# Patient Record
Sex: FEMALE | Race: WHITE | URBAN - METROPOLITAN AREA
[De-identification: names, ages, dates, MRNs, and addresses within clinical notes are randomized per-mention and may not be internally consistent; named-entity substitution may affect disease eponyms.]

---

## 2017-01-05 ENCOUNTER — OFFICE VISIT (OUTPATIENT)
Dept: FAMILY MEDICINE CLINIC | Age: 37
End: 2017-01-05

## 2017-01-05 VITALS
OXYGEN SATURATION: 100 % | BODY MASS INDEX: 42.15 KG/M2 | RESPIRATION RATE: 18 BRPM | SYSTOLIC BLOOD PRESSURE: 137 MMHG | HEART RATE: 78 BPM | TEMPERATURE: 98.1 F | DIASTOLIC BLOOD PRESSURE: 86 MMHG | WEIGHT: 253 LBS | HEIGHT: 65 IN

## 2017-01-05 DIAGNOSIS — M79.644 PAIN OF FINGER OF RIGHT HAND: Primary | ICD-10-CM

## 2017-01-05 NOTE — PROGRESS NOTES
Yary Morris is a 39 y.o. female presents to office for finger pain. 1. Have you been to the ER, urgent care clinic or hospitalized since your last visit? no  2. Have you seen any other providers outside of [facility] since your last visit? no  3. Have you had a Flu shot this year?  Pt declines      Health Maintenance items with a due date reviewed with patient:  Health Maintenance Due   Topic Date Due    DTaP/Tdap/Td series (1 - Tdap) 06/19/2001    PAP AKA CERVICAL CYTOLOGY  06/19/2001    INFLUENZA AGE 9 TO ADULT  08/01/2016

## 2017-01-05 NOTE — PATIENT INSTRUCTIONS
Hand Pain: Care Instructions  Your Care Instructions  Common causes of hand pain are overuse and injuries, such as might happen during sports or home repair projects. Everyday wear and tear, especially as you get older, also can cause hand pain. Most minor hand injuries will heal on their own, and home treatment is usually all you need to do. If you have sudden and severe pain, you may need tests and treatment. Follow-up care is a key part of your treatment and safety. Be sure to make and go to all appointments, and call your doctor if you are having problems. Its also a good idea to know your test results and keep a list of the medicines you take. How can you care for yourself at home? · Take pain medicines exactly as directed. ¨ If the doctor gave you a prescription medicine for pain, take it as prescribed. ¨ If you are not taking a prescription pain medicine, ask your doctor if you can take an over-the-counter medicine. · Rest and protect your hand. Take a break from any activity that may cause pain. · Put ice or a cold pack on your hand for 10 to 20 minutes at a time. Put a thin cloth between the ice and your skin. · Prop up the sore hand on a pillow when you ice it or anytime you sit or lie down during the next 3 days. Try to keep it above the level of your heart. This will help reduce swelling. · If your doctor recommends a sling, splint, or elastic bandage to support your hand, wear it as directed. When should you call for help? Call 911 anytime you think you may need emergency care. For example, call if:  · Your hand turns cool or pale or changes color. Call your doctor now or seek immediate medical care if:  · You cannot move your hand. · Your hand pops, moves out of its normal position, and then returns to its normal position. · You have signs of infection, such as:  ¨ Increased pain, swelling, warmth, or redness. ¨ Red streaks leading from the sore area.   ¨ Pus draining from a place on your hand. ¨ A fever. · Your hand feels numb or tingly. Watch closely for changes in your health, and be sure to contact your doctor if:  · Your hand feels unstable when you try to use it. · You do not get better as expected. · You have any new symptoms, such as swelling. · Bruises from an injury to your hand last longer than 2 weeks. Where can you learn more? Go to http://mario-felipe.info/. Enter R273 in the search box to learn more about \"Hand Pain: Care Instructions. \"  Current as of: May 27, 2016  Content Version: 11.1  © 5917-9585 [employer]. Care instructions adapted under license by [employer] (which disclaims liability or warranty for this information). If you have questions about a medical condition or this instruction, always ask your healthcare professional. [address] any warranty or liability for your use of this information.

## 2017-01-05 NOTE — PROGRESS NOTES
Abhi Oleary is a 39 y.o.  female and presents with persistent pain on the right middle finger. XR a few months ago normal.  Patient has mild to moderate pain and mild deformity still but edema has resolved. No neuropathy or radiculopathy. Chief Complaint   Patient presents with    Finger Pain     Subjective: Additional Concerns: none    Patient Active Problem List    Diagnosis Date Noted    Pain in finger of right hand 10/31/2016    Acute bilateral low back pain without sciatica 06/24/2016    Urinary frequency 06/24/2016    Vitamin D deficiency 02/04/2016    BMI 39.0-39.9,adult 02/04/2016    Restless sleeper 02/04/2016    Influenza vaccination declined 02/04/2016     Current Outpatient Prescriptions   Medication Sig Dispense Refill    ibuprofen (MOTRIN) 600 mg tablet Take 1 Tab by mouth every eight (8) hours as needed for Pain. Indications: PAIN 60 Tab 2    fluticasone (FLONASE) 50 mcg/actuation nasal spray 2 Sprays by Both Nostrils route daily. 1 Bottle 0    ergocalciferol (ERGOCALCIFEROL) 50,000 unit capsule Take 1 Cap by mouth every seven (7) days. 12 Cap 1    GUAIFENESIN/PSEUDOEPHEDRNE HCL (MUCINEX D PO) Take  by mouth.  metFORMIN (GLUCOPHAGE) 500 mg tablet Take 1 Tab by mouth two (2) times daily (with meals). 180 Tab 1    topiramate (TOPAMAX) 25 mg tablet Take 1 Tab by mouth two (2) times a day. 180 Tab 0    omega-3 fatty acids-vitamin e 1,000 mg cap Take 1 Cap by mouth.  cyanocobalamin 1,000 mcg tablet Take 1,000 mcg by mouth daily. Allergies   Allergen Reactions    Codeine Other (comments)     Pass out, sweaty    Morphine Itching     Hives     Past Medical History   Diagnosis Date    BMI 39.0-39.9,adult 2/4/2016    Restless sleeper 2/4/2016    Vitamin D deficiency      Past Surgical History   Procedure Laterality Date    Hx cholecystectomy      Hx appendectomy      Hx ankle fracture tx       right     No family history on file.   Social History Substance Use Topics    Smoking status: Never Smoker    Smokeless tobacco: Never Used    Alcohol use Yes      Comment: Once a week     ROS     General: negative for - chills, fatigue, fever, weight change  Resp: negative for - cough, shortness of breath or wheezing  CV: negative for - chest pain, edema or palpitations  Neuro: negative for -  weakness    Objective:  Vitals:    01/05/17 1656   BP: 137/86   Pulse: 78   Resp: 18   Temp: 98.1 °F (36.7 °C)   TempSrc: Oral   SpO2: 100%   Weight: 253 lb (114.8 kg)   Height: 5' 5\" (1.651 m)   PainSc:   3   PainLoc: Finger   LMP: 12/22/2016     PE    Alert, well appearing, and in no distress, oriented to person, place, and time and overweight  Mental status - alert, oriented to person, place, and time, normal mood, behavior, speech, dress, motor activity, and thought processes  Extremities - peripheral pulses normal, no pedal edema, no clubbing or cyanosis, mild pain on palpation for right middle finger with mild deformity. Flexion and extension function is preserved. LABS   No visits with results within 6 Month(s) from this visit.   Latest known visit with results is:    Office Visit on 06/24/2016   Component Date Value Ref Range Status    Color (UA POC) 06/24/2016 Yellow   Final    Clarity (UA POC) 06/24/2016 Cloudy   Final    Glucose (UA POC) 06/24/2016 Negative  Negative Final    Bilirubin (UA POC) 06/24/2016 Negative  Negative Final    Ketones (UA POC) 06/24/2016 Negative  Negative Final    Specific gravity (UA POC) 06/24/2016 1.025  1.001 - 1.035 Final    Blood (UA POC) 06/24/2016 3+  Negative Final    pH (UA POC) 06/24/2016 6  4.6 - 8.0 Final    Protein (UA POC) 06/24/2016 2+  Negative mg/dL Final    Urobilinogen (UA POC) 06/24/2016 0.2 mg/dL  0.2 - 1 Final    Nitrites (UA POC) 06/24/2016 Negative  Negative Final    Leukocyte esterase (UA POC) 06/24/2016 Trace  Negative Final       TESTS  Results for orders placed or performed in visit on 06/24/16 AMB POC URINALYSIS DIP STICK AUTO W/ MICRO   Result Value Ref Range    Color (UA POC) Yellow     Clarity (UA POC) Cloudy     Glucose (UA POC) Negative Negative    Bilirubin (UA POC) Negative Negative    Ketones (UA POC) Negative Negative    Specific gravity (UA POC) 1.025 1.001 - 1.035    Blood (UA POC) 3+ Negative    pH (UA POC) 6 4.6 - 8.0    Protein (UA POC) 2+ Negative mg/dL    Urobilinogen (UA POC) 0.2 mg/dL 0.2 - 1    Nitrites (UA POC) Negative Negative    Leukocyte esterase (UA POC) Trace Negative     Assessment/Plan:      Chronic mild right middle finger pain and deformity - Re XR will be done tomorrow. We will call patient for results and possible PT for this problem if XR os OK still. OTC meds enough. Lab review: no lab studies available for review at time of visit. I have discussed the diagnosis with the patient and the intended plan as seen in the above orders. The patient has received an after-visit summary and questions were answered concerning future plans. I have discussed medication side effects and warnings with the patient as well. I have reviewed the plan of care with the patient, accepted their input and they are in agreement with the treatment goals. Follow-up Disposition:  Return if symptoms worsen or fail to improve.     Jyoti Purcell MD

## 2017-01-17 ENCOUNTER — OFFICE VISIT (OUTPATIENT)
Dept: FAMILY MEDICINE CLINIC | Age: 37
End: 2017-01-17

## 2017-01-17 VITALS
OXYGEN SATURATION: 97 % | RESPIRATION RATE: 16 BRPM | TEMPERATURE: 98.2 F | SYSTOLIC BLOOD PRESSURE: 120 MMHG | DIASTOLIC BLOOD PRESSURE: 90 MMHG | WEIGHT: 254 LBS | BODY MASS INDEX: 42.32 KG/M2 | HEART RATE: 88 BPM | HEIGHT: 65 IN

## 2017-01-17 DIAGNOSIS — M25.532 LEFT WRIST PAIN: ICD-10-CM

## 2017-01-17 DIAGNOSIS — M79.644 PAIN IN FINGER OF RIGHT HAND: Primary | ICD-10-CM

## 2017-01-17 DIAGNOSIS — L30.9 DERMATITIS: ICD-10-CM

## 2017-01-17 DIAGNOSIS — M25.649 DECREASED RANGE OF MOTION OF FINGER: ICD-10-CM

## 2017-01-17 RX ORDER — BISMUTH SUBSALICYLATE 262 MG
1 TABLET,CHEWABLE ORAL DAILY
COMMUNITY
End: 2018-08-29

## 2017-01-17 RX ORDER — KETOCONAZOLE 200 MG/1
200 TABLET ORAL DAILY
Qty: 2 TAB | Refills: 0 | Status: SHIPPED | OUTPATIENT
Start: 2017-01-17 | End: 2018-08-29

## 2017-01-17 RX ORDER — KETOCONAZOLE 20 MG/G
CREAM TOPICAL 2 TIMES DAILY
Qty: 30 G | Refills: 1 | Status: SHIPPED | OUTPATIENT
Start: 2017-01-17 | End: 2018-04-10 | Stop reason: SDUPTHER

## 2017-01-17 NOTE — PATIENT INSTRUCTIONS
For your rash:  -- Single dose treatment:  Take 2 capsules of ketoconazole by mouth with a full glass of water. This treatment is much more effective if you exercise 4-6 hours after taking the dose to induce sweating. Allow sweat to dry on your skin and delay showering for 24 hours. This allows a film of medication to remain on your skin for a day and generally resolves the infection. Stay well hydrated. STAY UP TO DATE WITH YOUR PREVENTIVE HEALTH:     Please review the following recommendations and if you are not sure that your healthcare is up to date, ask your provider at your next scheduled office visit. -- Yearly preventive visits (sometimes called \"physicals\") are recommended for all adults. Medicare and Medicaid do not pay for physicals. Medicare covers a yearly wellness visit that differs in many ways from a traditional physical, but is an opportunity to make sure you are maximizing the preventive services that will be covered by Medicare. Each insurance carrier will have different regulations about what services may be covered, so be sure to talk with your insurance provider before scheduling a visit. -- Colon cancer screening is recommended for all patients 48 and older with either colonoscopy (interval will vary depending on results) or yearly stool testing.      -- Mammogram is recommended every 2 years for women ages 36 to 76. -- Pap smear is recommended every 3-5 years for women aged 24 to 72, unless your cervix has been surgically removed for benign reasons. -- Flu shot is recommended every fall for adults of all ages. -- Prevnar 15 is recommended at the age of 72 for all adults. -- Pneumovax is recommended one year after Prevnar 13 for all adults, but earlier if you have a history of chronic health problems (including diabetes and asthma) or smoking. -- Tetanus boosters are recommended every 10 years for adults of all ages.   Medicare and Medicaid do not cover tetanus as a preventive booster, but will pay for patients to receive a booster in the event of certain injuries. INSTRUCTIONS AFTER YOUR VISIT ON 1/17/2017     FASTING LABS  (fasting means nothing by mouth except medications with water or black coffee for at least 10 hours) were ordered to be completed    -- Our lab does not require a scheduled appointment. You can return to the office during lab hours within the next 1-2 weeks to complete your fasting labs. -- Lab hours at [facility] are 7a-3:00pm Monday-Friday. Please check the holiday closures below to make sure the office is open on the day you plan to return. LAB RESULTS can be obtained by logging into your [employer] account. If you need assistance with accessing your account, you can call the [address] [place] [address] at #[phone]. REFERRALS - If you were referred to a specialist or consultant today, this often needs to be authorized by your insurance company before it can be scheduled. You should be contacted within 2 weeks by the consultant's office to schedule the visit. If you do not hear from the specialist's office in that time frame, please call my office and the staff here can check on the status of your referral.      TESTING RESULTS   -- Lab results may become available for your review on [employer] before your provider has an opportunity to write you a note about results. Review of routine lab results will generally be done in 10-14 days. Please save your inquiries about results until your provider has had time to review them. -- If you have testing done outside of the office, please call to let us know the date and location that your testing was completed. Results can often be obtained faster if an inquiry is run.       MEDICATION REFILLS      -- Controlled substance refills (medications that require printed prescriptions for monitoring of use per [facility] guidelines) must be picked up in the office and per medical group policy will require a scheduled office visit with the provider. Calling the after hours answering service to request a controlled substance represents a violation of [facility] controlled substance policy. -- All other medication refills are to be requested by calling your pharmacy during regular office hours. The after hours physician on call is not required to respond to calls about medication refills. It is your responsibility to keep track of when you may be running out of medication and to place refill requests at least 3 business days prior. [facility]      Scheduling appointments can be done at the  or by calling [phone] and selecting option #1. HOLIDAY CLOSURES:     The office is closed on six major holidays each year:  New Year's Day, July 4th, Memorial Day, Labor Day, Thanksgiving, and Heladio Day. Lab and X-ray services are not available on those days.

## 2017-01-17 NOTE — MR AVS SNAPSHOT
Visit Information Date & Time Provider Department Dept. Phone Encounter #  
 1/17/2017  2:30 PM Aletha Church, 3 [facility] [phone] [id number] Upcoming Health Maintenance Date Due DTaP/Tdap/Td series (1 - Tdap) 6/19/2001 PAP AKA CERVICAL CYTOLOGY 6/19/2001 Allergies as of 1/17/2017  Review Complete On: 1/17/2017 By: Joel Andre LPN Severity Noted Reaction Type Reactions Codeine  02/04/2016    Other (comments) Pass out, sweaty Morphine  02/04/2016    Itching Hives Current Immunizations  Reviewed on 1/17/2017 No immunizations on file. Reviewed by Aletha Church MD on 1/17/2017 at  3:00 PM  
You Were Diagnosed With   
  
 Codes Comments Pain in finger of right hand    -  Primary ICD-10-CM: M64.238 ICD-9-CM: 729.5 Dermatitis     ICD-10-CM: L30.9 ICD-9-CM: 692.9 Vitals BP Pulse Temp Resp Height(growth percentile) Weight(growth percentile) 120/90 88 98.2 °F (36.8 °C) (Oral) 16 5' 5\" (1.651 m) 254 lb (115.2 kg) LMP SpO2 BMI OB Status Smoking Status 12/22/2016 (Exact Date) 97% 42.27 kg/m2 Having regular periods Never Smoker Vitals History BMI and BSA Data Body Mass Index Body Surface Area  
 42.27 kg/m 2 2.3 m 2 Preferred Pharmacy Pharmacy Name Phone [address] [place] [address] Drive [phone] Your Updated Medication List  
  
   
This list is accurate as of: 1/17/17  3:05 PM.  Always use your most recent med list.  
  
  
  
  
 cyanocobalamin 1,000 mcg tablet Take 1,000 mcg by mouth daily. ergocalciferol 50,000 unit capsule Commonly known as:  ERGOCALCIFEROL Take 1 Cap by mouth every seven (7) days. fluticasone 50 mcg/actuation nasal spray Commonly known as:  Nilda Manville 2 Sprays by Both Nostrils route daily. ibuprofen 600 mg tablet Commonly known as:  MOTRIN Take 1 Tab by mouth every eight (8) hours as needed for Pain. Indications: PAIN  
  
 * ketoconazole 200 mg tablet Commonly known as:  NIZORAL Take 1 Tab by mouth daily. * ketoconazole 2 % topical cream  
Commonly known as:  NIZORAL Apply  to affected area two (2) times a day. metFORMIN 500 mg tablet Commonly known as:  GLUCOPHAGE Take 1 Tab by mouth two (2) times daily (with meals). MUCINEX D PO Take  by mouth.  
  
 multivitamin tablet Commonly known as:  ONE A DAY Take 1 Tab by mouth daily. omega-3 fatty acids-vitamin e 1,000 mg Cap Take 1 Cap by mouth. PROBIOTIC 4X 10-15 mg Tbec Generic drug:  B.infantis-B.ani-B.long-B.bifi Take  by mouth. topiramate 25 mg tablet Commonly known as:  TOPAMAX Take 1 Tab by mouth two (2) times a day. * Notice: This list has 2 medication(s) that are the same as other medications prescribed for you. Read the directions carefully, and ask your doctor or other care provider to review them with you. Prescriptions Sent to Pharmacy Refills  
 ketoconazole (NIZORAL) 200 mg tablet 0 Sig: Take 1 Tab by mouth daily. Class: Normal  
 Pharmacy: [facility] [employer] [facility] [place] [address]. [facility] [address] [place] [address] #: [phone] Route: Oral  
 ketoconazole (NIZORAL) 2 % topical cream 1 Sig: Apply  to affected area two (2) times a day. Class: Normal  
 Pharmacy: [facility] [employer] [facility] [address], [facility] [address] [facility] Ph #: [phone] Route: Topical  
  
We Performed the Following REFERRAL TO HAND SURGERY [REF31 Custom] Comments:  
 Please evaluate patient for persistent right 3rd finger pain and limitation of flexion/extension since jamming finger in early October. No fracture on initial X-rays. Still painful with certain activities and movements. Referral Information Referral ID Referred By Referred To  
  
 [id number] Cesario Martel MD   
   [facility] [address]   
   [facility], [address] Phone: [phone] Fax: [phone] Visits Status Start Date End Date 1 Authorized 1/17/17 1/17/18 If your referral has a status of pending review or denied, additional information will be sent to support the outcome of this decision. Patient Instructions For your rash: 
-- Single dose treatment:  Take 2 capsules of ketoconazole by mouth with a full glass of water. This treatment is much more effective if you exercise 4-6 hours after taking the dose to induce sweating. Allow sweat to dry on your skin and delay showering for 24 hours. This allows a film of medication to remain on your skin for a day and generally resolves the infection. Stay well hydrated. STAY UP TO DATE WITH YOUR PREVENTIVE HEALTH:    
Please review the following recommendations and if you are not sure that your healthcare is up to date, ask your provider at your next scheduled office visit. -- Yearly preventive visits (sometimes called \"physicals\") are recommended for all adults. Medicare and Medicaid do not pay for physicals. Medicare covers a yearly wellness visit that differs in many ways from a traditional physical, but is an opportunity to make sure you are maximizing the preventive services that will be covered by Medicare. Each insurance carrier will have different regulations about what services may be covered, so be sure to talk with your insurance provider before scheduling a visit. -- Colon cancer screening is recommended for all patients 48 and older with either colonoscopy (interval will vary depending on results) or yearly stool testing.   
 
-- Mammogram is recommended every 2 years for women ages 36 to 76.  
-- Pap smear is recommended every 3-5 years for women aged 24 to 72, unless your cervix has been surgically removed for benign reasons. -- Flu shot is recommended every fall for adults of all ages. -- Prevnar 15 is recommended at the age of 72 for all adults. -- Pneumovax is recommended one year after Prevnar 13 for all adults, but earlier if you have a history of chronic health problems (including diabetes and asthma) or smoking. -- Tetanus boosters are recommended every 10 years for adults of all ages. Medicare and Medicaid do not cover tetanus as a preventive booster, but will pay for patients to receive a booster in the event of certain injuries. INSTRUCTIONS AFTER YOUR VISIT ON 1/17/2017 FASTING LABS  (fasting means nothing by mouth except medications with water or black coffee for at least 10 hours) were ordered to be completed  
 -- Our lab does not require a scheduled appointment. You can return to the office during lab hours within the next 1-2 weeks to complete your fasting labs. -- Lab hours at [facility] are 7a-3:00pm Monday-Friday. Please check the holiday closures below to make sure the office is open on the day you plan to return. LAB RESULTS can be obtained by logging into your [employer] account. If you need assistance with accessing your account, you can call the [address] [place] [address] at #[phone]. REFERRALS - If you were referred to a specialist or consultant today, this often needs to be authorized by your insurance company before it can be scheduled. You should be contacted within 2 weeks by the consultant's office to schedule the visit. If you do not hear from the specialist's office in that time frame, please call my office and the staff here can check on the status of your referral.   
 
TESTING RESULTS  
-- Lab results may become available for your review on Bloxy before your provider has an opportunity to write you a note about results.   Review of routine lab results will generally be done in 10-14 days. Please save your inquiries about results until your provider has had time to review them. -- If you have testing done outside of the office, please call to let us know the date and location that your testing was completed. Results can often be obtained faster if an inquiry is run. MEDICATION REFILLS   
 
-- Controlled substance refills (medications that require printed prescriptions for monitoring of use per [facility] guidelines) must be picked up in the office and per medical group policy will require a scheduled office visit with the provider. Calling the after hours answering service to request a controlled substance represents a violation of [facility] controlled substance policy. -- All other medication refills are to be requested by calling your pharmacy during regular office hours. The after hours physician on call is not required to respond to calls about medication refills. It is your responsibility to keep track of when you may be running out of medication and to place refill requests at least 3 business days prior. [place] Scheduling appointments can be done at the  or by calling [phone] and selecting option #1. HOLIDAY CLOSURES:  
 
The office is closed on six major holidays each year:  New Year's Day, July 4th, Memorial Day, Labor Day, Thanksgiving, and Donovan Day. Lab and X-ray services are not available on those days. Introducing [facility]! [facility] introduces [employer] patient portal. Now you can access parts of your medical record, email your doctor's office, and request medication refills online. 1. In your internet browser, go to [email or web address] 2. Click on the First Time User? Click Here link in the Sign In box. You will see the New Member Sign Up page. 3. Enter your [employer] Access Code exactly as it appears below. You will not need to use this code after youve completed the sign-up process. If you do not sign up before the expiration date, you must request a new code. · [employer] Access Code: [email or web address] Expires: 4/17/2017  3:04 PM 
 
4. Enter the last four digits of your Social Security Number (xxxx) and Date of Birth (mm/dd/yyyy) as indicated and click Submit. You will be taken to the next sign-up page. 5. Create a [employer] ID. This will be your [employer] login ID and cannot be changed, so think of one that is secure and easy to remember. 6. Create a [employer] password. You can change your password at any time. 7. Enter your Password Reset Question and Answer. This can be used at a later time if you forget your password. 8. Enter your e-mail address. You will receive e-mail notification when new information is available in [address] 9. Click Sign Up. You can now view and download portions of your medical record. 10. Click the Download Summary menu link to download a portable copy of your medical information. If you have questions, please visit the Frequently Asked Questions section of the [employer] website. Remember, [employer] is NOT to be used for urgent needs. For medical emergencies, dial 911. Now available from your iPhone and Android! Please provide this summary of care documentation to your next provider. Your primary care clinician is listed as Dorothy Bates. If you have any questions after today's visit, please call [phone].

## 2017-01-17 NOTE — PROGRESS NOTES
PRE-VISIT PLANNING:     No future appointments. Xochitl Boston is a patient of Mike Jara MD who is scheduled for an office visit with Mike Jara MD on 2017 for patient complaint of \"wrist issues\". Encounter opened prior to visit to complete pre-visit planning, update and review pertinent sections in the chart. Last office visit with PCP was 16 for new patient visit/establish care. Has seen other [place] offices several times for right middle finger pain secondary to jamming finger while playing [employer] in October. Rooming Nurse Items:  -- Offer TDAP  -- Release for last Pap smear report    Pending items for provider to review with patient:    Health Maintenance Due   Topic Date Due    DTaP/Tdap/Td series (1 - Tdap) 2001    PAP AKA CERVICAL CYTOLOGY  2001          Internal Medicine/Primary Care  Acute Care Visit  [address] [facility]  [address], [facility] [place] [address]  Phone [phone]  Fax [phone]    Date of Service:  2017  Patient's Name: Xochitl Boston   Patient's :  1980     Subjective/Discussion:        Chief Complaint   Patient presents with    Wrist Pain    Rash        Xochitl Boston is a 39 y.o. female patient of Mike Jara MD who was seen today for an acute care visit with complaints of pain in R 3rd finger pain. Also complaining of two weeks left wrist pain that started without trauma or injury, feels like wrist catches. Worse with typing. 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 Has never had similar pain in wrist.      Also c/o rash in leg folds, thought she got it from a tanning bed. Not itchy at all. Started first week of December, initially noticed redness/rawness in leg folds/groin, but has spread to involve skin under breasts and tiny red dots across her abdomen.   Has treated with moderate success with use of OTC \"jock itch\" cream for the past 3 weeks. Tried 3 different brands OTC, lotrimin seemed to work best.  Improved, but won't completely resolve. Review of Systems (positives in bold):  CONST:   fevers, chills, shaking rigors,     fatigue, malaise, unintentional weight change, appetite change  NEURO:   headaches, vision changes, altered vision, altered hearing, dizziness,     loss of consciousness, confusion, delirium, speech change, seizures, tremors,    focal numbness/tingling/sensory change, focal weakness,    loss of bowel control, loss of bladder control, new gait imbalance  MS:      muscle pain, soft tissue swelling, diffuse muscle aches/myalgias,     extremity pain, neck pain, back pain,     joint pain, joint swelling/redness, decreased ROM,     joint crepitus, joint instability/buckling, joints catching or triggering  SKIN:        Rashes/skin changes, itching, blisters, drainage/pustules,     open cuts or sores, nonhealing wounds, history of surgical hardware    Objective:     VS:    Visit Vitals    /90    Pulse 88    Temp 98.2 °F (36.8 °C) (Oral)    Resp 16    Ht 5' 5\" (1.651 m)    Wt 254 lb (115.2 kg)    LMP 12/22/2016 (Exact Date)    SpO2 97%    BMI 42.27 kg/m2       General:   Pleasant young female, seated comfortably in exam room, obese, appears well, well-groomed, conversant, alert, in no acute distress.      Extremities:   Right 3rd finger with mild swelling at PIP with mild TTP on sides of joint, no erythema or calor, but slight limitation in flexion and extension of the joint, normal appearing fingernail/tip, normal sensation in digit    No other small joint swelling or synovitis    Left wrist pain with pressure directly over ulnar aspect of 4th proximal phalange and with bringing left thumb to pinky  Skin:    Mild erythema in skin folds beneath both breasts and in both groin folds    Anterior abdomen with small nonconfluent areas of pinpoint erythema with small amount of fine scale       Assessment/Plan: Emmanuel Taylor was seen today for R finger pain, left wrist pain, and rash on trunk. Discussed options of starting OT for finger vs specialist consultation, patient and I are in agreement that with limited ROM 3rd finger of dominant hand for 12 weeks that getting hand specialist opinion would be best.  Dermatitis in skin folds appears fungal, pinpoint erythema across abdomen may be fungal vs id reaction vs other dermatitis. Discussed options of empiric treatment vs referral to derm with patient. She prefers to try empiric treatment with antifungal and if not improving she will call me next week and I will refer her to dermatology. ICD-10-CM ICD-9-CM    1. Pain in finger of right hand M79.644 729.5 REFERRAL TO HAND SURGERY   2. Decreased range of motion of finger M25.649 719.54 REFERRAL TO HAND SURGERY   3. Left wrist pain M25.532 719.43 REFERRAL TO HAND SURGERY   4. Dermatitis L30.9 692.9 ketoconazole (NIZORAL) 200 mg tablet      ketoconazole (NIZORAL) 2 % topical cream       The patient states understanding and agrees with the treatment plan. All questions were answered. Sylwia Whitaker MD - Internal Medicine  1/17/2017, 8:45 AM    Patient Care Team:  Sylwia Whitaker MD as PCP - General (Internal Medicine)      Additional History     Past Medical History   Diagnosis Date    BMI 39.0-39.9,adult 2/4/2016    Restless sleeper 2/4/2016    Vitamin D deficiency      Past Surgical History   Procedure Laterality Date    Hx cholecystectomy      Hx appendectomy      Hx ankle fracture tx       right     Social History   Substance Use Topics    Smoking status: Never Smoker    Smokeless tobacco: Never Used    Alcohol use Yes      Comment: Once a week     Current Outpatient Prescriptions   Medication Sig    multivitamin (ONE A DAY) tablet Take 1 Tab by mouth daily.  B.infantis-B.ani-B.long-B.bifi (PROBIOTIC 4X) 10-15 mg TbEC Take  by mouth.     ibuprofen (MOTRIN) 600 mg tablet Take 1 Tab by mouth every eight (8) hours as needed for Pain. Indications: PAIN    ergocalciferol (ERGOCALCIFEROL) 50,000 unit capsule Take 1 Cap by mouth every seven (7) days.  GUAIFENESIN/PSEUDOEPHEDRNE HCL (MUCINEX D PO) Take  by mouth.  fluticasone (FLONASE) 50 mcg/actuation nasal spray 2 Sprays by Both Nostrils route daily.  metFORMIN (GLUCOPHAGE) 500 mg tablet Take 1 Tab by mouth two (2) times daily (with meals).  topiramate (TOPAMAX) 25 mg tablet Take 1 Tab by mouth two (2) times a day.  omega-3 fatty acids-vitamin e 1,000 mg cap Take 1 Cap by mouth.  cyanocobalamin 1,000 mcg tablet Take 1,000 mcg by mouth daily. No current facility-administered medications for this visit. Allergies   Allergen Reactions    Codeine Other (comments)     Pass out, sweaty    Morphine Itching     Hives            This document may have been created with the aid of dictation software. In spite of review and editing, text may contain errors, particularly phonetic errors.

## 2017-01-17 NOTE — PROGRESS NOTES
Kassandra Bernheim is a 39 y.o. female  Pt here today for left wrist pain, right middle finger pain, and a skin rash. 1. Have you been to the ER, urgent care clinic since your last visit? Hospitalized since your last visit? Yes Where: urgent care    2. Have you seen or consulted any other health care providers outside of the [address] [place] [address] since your last visit? Include any pap smears or colon screening.  No

## 2017-01-18 PROBLEM — M25.532 LEFT WRIST PAIN: Status: ACTIVE | Noted: 2017-01-18

## 2017-01-18 PROBLEM — L30.9 DERMATITIS: Status: ACTIVE | Noted: 2017-01-18

## 2017-01-18 PROBLEM — M25.649 DECREASED RANGE OF MOTION OF FINGER: Status: ACTIVE | Noted: 2017-01-18

## 2017-12-08 ENCOUNTER — OFFICE VISIT (OUTPATIENT)
Dept: FAMILY MEDICINE CLINIC | Age: 37
End: 2017-12-08

## 2017-12-08 VITALS
WEIGHT: 241 LBS | BODY MASS INDEX: 40.15 KG/M2 | OXYGEN SATURATION: 94 % | DIASTOLIC BLOOD PRESSURE: 91 MMHG | HEIGHT: 65 IN | SYSTOLIC BLOOD PRESSURE: 140 MMHG | TEMPERATURE: 97.6 F | RESPIRATION RATE: 18 BRPM | HEART RATE: 89 BPM

## 2017-12-08 DIAGNOSIS — L08.9 LOCAL INFECTION OF SKIN AND SUBCUTANEOUS TISSUE: Primary | ICD-10-CM

## 2017-12-08 DIAGNOSIS — Z98.890 HISTORY OF ORTHOPEDIC SURGERY: ICD-10-CM

## 2017-12-08 DIAGNOSIS — M25.572 LEFT ANKLE PAIN, UNSPECIFIED CHRONICITY: ICD-10-CM

## 2017-12-08 PROBLEM — E66.01 OBESITY, MORBID (HCC): Status: ACTIVE | Noted: 2017-12-08

## 2017-12-08 RX ORDER — MUPIROCIN 20 MG/G
OINTMENT TOPICAL DAILY
Qty: 22 G | Refills: 0 | Status: SHIPPED | OUTPATIENT
Start: 2017-12-08 | End: 2018-08-29

## 2017-12-08 RX ORDER — DOXYCYCLINE 100 MG/1
100 TABLET ORAL 2 TIMES DAILY
Qty: 20 TAB | Refills: 0 | Status: SHIPPED | OUTPATIENT
Start: 2017-12-08 | End: 2017-12-18

## 2017-12-08 NOTE — PROGRESS NOTES
Sunni Farley is a 40 y.o. female (: 1980) presenting to address:ankle swelling, pain x3 weeks     Chief Complaint   Patient presents with    Ankle swelling     Left; x3 weeks     Ankle Pain       Vitals:    17 0906   BP: (!) 140/91   Pulse: 89   Resp: 18   Temp: 97.6 °F (36.4 °C)   TempSrc: Oral   SpO2: 94%   Weight: 241 lb (109.3 kg)   Height: 5' 5\" (1.651 m)   PainSc:   0 - No pain   LMP: 2017       Hearing/Vision:   No exam data present    Learning Assessment:     Learning Assessment 2016   PRIMARY LEARNER Patient   HIGHEST LEVEL OF EDUCATION - PRIMARY LEARNER  > 4 YEARS OF COLLEGE   BARRIERS PRIMARY LEARNER NONE   CO-LEARNER CAREGIVER No   PRIMARY LANGUAGE ENGLISH    NEED No   LEARNER PREFERENCE PRIMARY READING   LEARNING SPECIAL TOPICS none   ANSWERED BY patient   RELATIONSHIP SELF   ASSESSMENT COMMENT n/a     Depression Screening:     PHQ over the last two weeks 2017   Little interest or pleasure in doing things Not at all   Feeling down, depressed or hopeless Not at all   Total Score PHQ 2 0     Fall Risk Assessment:   No flowsheet data found. Abuse Screening:     Abuse Screening Questionnaire 2016   Do you ever feel afraid of your partner? N   Are you in a relationship with someone who physically or mentally threatens you? N   Is it safe for you to go home? Y     Coordination of Care Questionaire:   1. Have you been to the ER, urgent care clinic since your last visit? Hospitalized since your last visit? no    2. Have you seen or consulted any other health care providers outside of the [address] [place] [address] since your last visit? Include any pap smears or colon screening. Othro    Advanced Directive:   1. Do you have an Advanced Directive? no    2. Would you like information on Advanced Directives? No    Patient declined flu vaccine.

## 2017-12-26 ENCOUNTER — TELEPHONE (OUTPATIENT)
Dept: FAMILY MEDICINE CLINIC | Age: 37
End: 2017-12-26

## 2017-12-26 NOTE — TELEPHONE ENCOUNTER
Maria Luisa with [facility] called regarding referral that was sent to Dr Dionne Strickland on 12/8/17. She stated Dr Dionne Strickland recommend pt be seen at [facility] and referral needs to be sent there for 2nd opinion.  Please f/u with ofc [phone]

## 2017-12-27 NOTE — TELEPHONE ENCOUNTER
Spoke with patient. Advised of instructions from NP. Patient verbalized understanding but stated that she did not understanding why Dr. Dionne Strickland would not see her. I advised that we were told her felt more comfortable referring her case to someone with a bit more experience. Patient stated that her case is not that complicated but she will call [facility]. No further concerns at this time.

## 2017-12-27 NOTE — TELEPHONE ENCOUNTER
Verify the patient is aware of this suggestion and let her know we recommend she see current ortho or follow up in [facility] as well   Thanks

## 2017-12-27 NOTE — TELEPHONE ENCOUNTER
Called and spoke with nurse of Dr. Joie Greene. Nurse stated that Dr. Joie Greene did not physically see the patient that we referred but read over her chart. Dr. Joie Greene feels that this patient needs to e referred to [facility] ([facility] in [place]). When asked why he felt this way his nurse stated that they refer many patients there when the Dr. Amadeo Atkinson like he cannot handle the case and in this case also because the surgery was done by another Dr. Joan Claudio that I would like to provider know about the situation. According to referral in system, patient was made aware of suggestion to schedule with [facility].

## 2018-01-09 ENCOUNTER — TELEPHONE (OUTPATIENT)
Dept: FAMILY MEDICINE CLINIC | Age: 38
End: 2018-01-09

## 2018-01-09 NOTE — TELEPHONE ENCOUNTER
Called pt and left message for her to call office. Pt will need to be seen by a physician that will test for this. Dr. Megan Maria does not do this type of testing.

## 2018-03-21 ENCOUNTER — TELEPHONE (OUTPATIENT)
Dept: FAMILY MEDICINE CLINIC | Age: 38
End: 2018-03-21

## 2018-03-21 NOTE — TELEPHONE ENCOUNTER
Pt is scheduled for Dr Gavin Rojo next available appt and she is requesting a muscle relaxer, Soma be prescribed to her because she is having leg pain and her job requires her to be on her feet. She just wants enough to last her until her appointment. Please advise.      Future Appointments  Date Time Provider Trever Smith   3/27/2018 8:30 AM Angus Pimentel MD [facility]

## 2018-03-22 NOTE — TELEPHONE ENCOUNTER
Needs appt first - muscle relaxer may not be appropriate as pain may not be from muscle spasm and additionally cause drowsiness, so most patients do not take muscle relaxers if they are attending work.

## 2018-03-22 NOTE — TELEPHONE ENCOUNTER
Pt notified. Pt states this is not her first rodeo and she knows what a muscle spasm is. Pt also states she does not take these during the day only when she goes to bed. Pt states she cannot see her surgeon until April and she cannot wait that long. Pt states she is a  and is on her feet for 12 hours. \" She needs these pills\" hun. I explained I didn't think you could do this without an apt but she wanted me to as anyway?  Please advise

## 2018-03-27 ENCOUNTER — OFFICE VISIT (OUTPATIENT)
Dept: FAMILY MEDICINE CLINIC | Age: 38
End: 2018-03-27

## 2018-03-27 VITALS
OXYGEN SATURATION: 100 % | TEMPERATURE: 97.2 F | RESPIRATION RATE: 20 BRPM | BODY MASS INDEX: 41.75 KG/M2 | HEART RATE: 73 BPM | SYSTOLIC BLOOD PRESSURE: 131 MMHG | DIASTOLIC BLOOD PRESSURE: 79 MMHG | HEIGHT: 65 IN | WEIGHT: 250.6 LBS

## 2018-03-27 DIAGNOSIS — R52 BODY ACHES: ICD-10-CM

## 2018-03-27 DIAGNOSIS — L30.9 ECZEMA OF HAND: ICD-10-CM

## 2018-03-27 DIAGNOSIS — M62.838 MUSCLE SPASM OF LEFT LOWER EXTREMITY: Primary | ICD-10-CM

## 2018-03-27 DIAGNOSIS — M79.641 RIGHT HAND PAIN: ICD-10-CM

## 2018-03-27 RX ORDER — CYCLOBENZAPRINE HCL 10 MG
5-10 TABLET ORAL
Qty: 20 TAB | Refills: 1 | Status: SHIPPED | OUTPATIENT
Start: 2018-03-27 | End: 2018-08-29

## 2018-03-27 RX ORDER — TRIAMCINOLONE ACETONIDE 5 MG/G
OINTMENT TOPICAL 2 TIMES DAILY
Qty: 30 G | Refills: 0 | Status: SHIPPED | OUTPATIENT
Start: 2018-03-27 | End: 2018-08-29

## 2018-03-27 NOTE — PROGRESS NOTES
Darleen Marcelino is a 40 y.o. female (: 1980) presenting to address:    Chief Complaint   Patient presents with    Foot Pain       Vitals:    18 0819   Resp: 20   Weight: 250 lb 9.6 oz (113.7 kg)   Height: 5' 5\" (1.651 m)   PainSc:   0 - No pain   LMP: 2018       Hearing/Vision:   No exam data present    Learning Assessment:     Learning Assessment 2016   PRIMARY LEARNER Patient   HIGHEST LEVEL OF EDUCATION - PRIMARY LEARNER  > 4 YEARS OF COLLEGE   BARRIERS PRIMARY LEARNER NONE   CO-LEARNER CAREGIVER No   PRIMARY LANGUAGE ENGLISH    NEED No   LEARNER PREFERENCE PRIMARY READING   LEARNING SPECIAL TOPICS none   ANSWERED BY patient   RELATIONSHIP SELF   ASSESSMENT COMMENT n/a     Depression Screening:     PHQ over the last two weeks 3/27/2018   Little interest or pleasure in doing things Not at all   Feeling down, depressed or hopeless Not at all   Total Score PHQ 2 0     Fall Risk Assessment:   No flowsheet data found. Abuse Screening:     Abuse Screening Questionnaire 3/27/2018   Do you ever feel afraid of your partner? N   Are you in a relationship with someone who physically or mentally threatens you? N   Is it safe for you to go home? Y     Coordination of Care Questionaire:   1. Have you been to the ER, urgent care clinic since your last visit? Hospitalized since your last visit? NO    2. Have you seen or consulted any other health care providers outside of the [address] [place] [address] since your last visit? Include any pap smears or colon screening. NO    Advanced Directive:   1. Do you have an Advanced Directive? NO    2. Would you like information on Advanced Directives?  NO

## 2018-03-27 NOTE — PROGRESS NOTES
PRE-VISIT PLANNING:     Future Appointments  Date Time Provider Trever Smith   3/27/2018 8:30 AM Tahir Quiroz MD [address] (PCP is Tahir Quiroz MD) is scheduled for an office visit with Tahir Quiroz MD on 2018 for evaluation of neck pain. Encounter opened prior to visit to complete pre-visit planning, update and review pertinent sections in the chart. Last office visit with PCP was 17. Rooming Nurse Items:  -- Flu shot, TDAP  -- Release for last Pap smear report   -- Release for last surgical notes (provider at [facility] for ankle)    Pending items for provider to review with patient:  Health Maintenance Due   Topic Date Due    DTaP/Tdap/Td series (1 - Tdap) 2001    PAP AKA CERVICAL CYTOLOGY  2001    Influenza Age 9 to Adult  2017          Internal Medicine  Acute Care Visit  3 [facility] at [address], [address] [facility]  Phone [phone]; Fax [phone]    Date of Service:  2018  Patient's Name & :   Fco Cardenas - [id number]   Patient's PCP:  Tahir Quiroz MD     Assessment/Plan:       Fco Cardenas is a 40 y.o. female who was seen today for an acute care visit. The primary encounter diagnosis was Muscle spasm of left lower extremity. Diagnoses of Eczema of hand and Body aches were also pertinent to this visit. D/w patient recommendation to change to occasional PRN flexeril for nocturnal muscle spasms in left leg given increased risk profile with Soma and status as schedule IV controlled substance. Topical TAC ointment for hand eczema PRN, consider trial of OTC Gloves in a Bottle with reapplication every 4-6 hours at work  Okay to call PCP office for ibuprofen request, d/w pt expectation for OV within 1 year to oversee routine med refills  Body mass index is 41.7 kg/(m^2).  Discussed achieving/maintaining healthy weight and BMI and impact of obesity on weight-bearing joints. Follow up BMI/weight at next visit. Orders Placed This Encounter    triamcinolone acetonide (KENALOG) 0.5 % ointment    cyclobenzaprine (FLEXERIL) 10 mg tablet       Patient instructions: You can use the steroid ointment on your hands. You can also try Gloves in a Bottle lotion. Okay to use Flexeril (muscle relaxer) as needed at bedtime for muscle spasms in your legs. Okay to call for ibuprofen refill if seen within a year at this office. Call and request an earlier appointment if you have any new questions or concerns. The patient states understanding of recommended treatment plan. Porsche Brambila MD - Internal Medicine  3/27/2018, 8:16 PM    Subjective/Discussion:        Chief Complaint   Patient presents with    Medication Evaluation      Patient states she is here requesting medications. She has recurrent itching on hands with occasional blistering, states diagnosed as eczema in the past, usually on palms, sometimes on fingers. 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 Usually stays controlled with OTC [employer] lotion, but sometimes flares up. Has treated with topical steroid in the past with good success. She had left ankle surgery June 2017 and has oozing from wound, seeing specialist at [facility] who ruled out osteomyelitis - pt notes that her specialist feels it may not be infected and oozing may simply be from proximity of hardware to skin. Has follow up appt in April. , but states no follow up until April and is requesting a refill of muscle relaxer. Previously had been prescribed Soma. Pt states she has no neck pain, just leg spasms at nighttime occasionally, usually after working long shifts on her feet as a .      She sometimes takes ibuprofen 800 mg for generalized body aches after long shifts, but states only rarely - she recently filled a bottle. She would like to be able to refill through PCP office in the future as she finds it bothersome to take 4 of the over the counter ibuprofen tablets to get equivalent dosing. Review of Systems   Gastrointestinal: Negative for abdominal pain, blood in stool, constipation, diarrhea, heartburn, melena, nausea and vomiting. Musculoskeletal: Positive for myalgias. Negative for back pain, falls, joint pain and neck pain. Nocturnal leg spasms (left leg)   Skin: Positive for itching and rash. Persistent oozing from surgical wound left ankle    Neurological: Negative for dizziness, tingling, tremors, sensory change, speech change, focal weakness, seizures, loss of consciousness and headaches. Objective:     /79 (BP 1 Location: Left arm, BP Patient Position: Sitting)  Pulse 73  Temp 97.2 °F (36.2 °C) (Oral)   Resp 20  Ht 5' 5\" (1.651 m)  Wt 250 lb 9.6 oz (113.7 kg)  LMP 03/12/2018 (Exact Date)  SpO2 100%  BMI 41.7 kg/m2    Physical Exam   Constitutional: She is oriented to person, place, and time. She appears well-developed and well-nourished. No distress. Age appropriate, obese female   HENT:   Head: Normocephalic and atraumatic. Eyes: Conjunctivae and EOM are normal. Right eye exhibits no discharge. Left eye exhibits no discharge. No scleral icterus. Pulmonary/Chest: Effort normal.   Neurological: She is alert and oriented to person, place, and time. Skin: Skin is warm and dry. Rash noted. She is not diaphoretic. No erythema. No pallor.             Additional History     Past Medical History:   Diagnosis Date    BMI 39.0-39.9,adult 2/4/2016    Restless sleeper 2/4/2016    Vitamin D deficiency      Past Surgical History:   Procedure Laterality Date    HX ANKLE FRACTURE TX      right    HX APPENDECTOMY      HX CHOLECYSTECTOMY       Social History   Substance Use Topics    Smoking status: Never Smoker    Smokeless tobacco: Never Used    Alcohol use Yes      Comment: Once a week     Current Outpatient Prescriptions   Medication Sig    triamcinolone acetonide (KENALOG) 0.5 % ointment Apply  to affected area two (2) times a day. use thin layer to eczema on hands as needed    cyclobenzaprine (FLEXERIL) 10 mg tablet Take 0.5-1 Tabs by mouth nightly as needed for Muscle Spasm(s) (nocturnal leg spasms).  ibuprofen (MOTRIN) 600 mg tablet Take 1 Tab by mouth every eight (8) hours as needed for Pain. Indications: PAIN (Patient taking differently: Take 800 mg by mouth every eight (8) hours as needed for Pain. Indications: Pain)    mupirocin (BACTROBAN) 2 % ointment Apply  to affected area daily.  multivitamin (ONE A DAY) tablet Take 1 Tab by mouth daily.  B.infantis-B.ani-B.long-B.bifi (PROBIOTIC 4X) 10-15 mg TbEC Take  by mouth.  ketoconazole (NIZORAL) 200 mg tablet Take 1 Tab by mouth daily.  ketoconazole (NIZORAL) 2 % topical cream Apply  to affected area two (2) times a day.  fluticasone (FLONASE) 50 mcg/actuation nasal spray 2 Sprays by Both Nostrils route daily.  ergocalciferol (ERGOCALCIFEROL) 50,000 unit capsule Take 1 Cap by mouth every seven (7) days.  metFORMIN (GLUCOPHAGE) 500 mg tablet Take 1 Tab by mouth two (2) times daily (with meals).  topiramate (TOPAMAX) 25 mg tablet Take 1 Tab by mouth two (2) times a day.  omega-3 fatty acids-vitamin e 1,000 mg cap Take 1 Cap by mouth.  cyanocobalamin 1,000 mcg tablet Take 1,000 mcg by mouth daily. No current facility-administered medications for this visit. Allergies   Allergen Reactions    Codeine Other (comments)     Pass out, sweaty    Morphine Itching     Hives            This document may have been created with the aid of dictation software. In spite of review and editing, text may contain errors, particularly phonetic errors.

## 2018-03-27 NOTE — MR AVS SNAPSHOT
[address] [facility] [address] [phone] Patient: Ivelisse Espinosa MRN: [id number] AOX:[birth year] Visit Information Date & Time Provider Department Dept. Phone Encounter #  
 3/27/2018  8:30 AM Marcelo Norwood MD [facility] [phone] [id number] Upcoming Health Maintenance Date Due DTaP/Tdap/Td series (1 - Tdap) 6/19/2001 PAP AKA CERVICAL CYTOLOGY 6/19/2001 Influenza Age 5 to Adult 8/1/2017 Allergies as of 3/27/2018  Review Complete On: 3/27/2018 By: Michael Toro LPN Severity Noted Reaction Type Reactions Codeine  02/04/2016    Other (comments) Pass out, sweaty Morphine  02/04/2016    Itching Hives Current Immunizations  Reviewed on 1/17/2017 No immunizations on file. Not reviewed this visit You Were Diagnosed With   
  
 Codes Comments Muscle spasm of left lower extremity    -  Primary ICD-10-CM: Y19.693 ICD-9-CM: 728.85 Eczema of hand     ICD-10-CM: L30.9 ICD-9-CM: 692.9 Body aches     ICD-10-CM: R52 ICD-9-CM: 780.96 Vitals Resp Height(growth percentile) Weight(growth percentile) LMP BMI OB Status 20 5' 5\" (1.651 m) 250 lb 9.6 oz (113.7 kg) 03/12/2018 (Exact Date) 41.7 kg/m2 Having regular periods Smoking Status Never Smoker Vitals History BMI and BSA Data Body Mass Index Body Surface Area 41.7 kg/m 2 2.28 m 2 Preferred Pharmacy Pharmacy Name Phone [address] [facility], [facility] [address] [phone] Your Updated Medication List  
  
   
This list is accurate as of 3/27/18  8:43 AM.  Always use your most recent med list.  
  
  
  
  
 cyanocobalamin 1,000 mcg tablet Take 1,000 mcg by mouth daily. cyclobenzaprine 10 mg tablet Commonly known as:  FLEXERIL  
 Take 0.5-1 Tabs by mouth nightly as needed for Muscle Spasm(s) (nocturnal leg spasms). ergocalciferol 50,000 unit capsule Commonly known as:  ERGOCALCIFEROL Take 1 Cap by mouth every seven (7) days. fluticasone 50 mcg/actuation nasal spray Commonly known as:  Cheron Bogdan 2 Sprays by Both Nostrils route daily. ibuprofen 600 mg tablet Commonly known as:  MOTRIN Take 1 Tab by mouth every eight (8) hours as needed for Pain. Indications: PAIN  
  
 * ketoconazole 200 mg tablet Commonly known as:  NIZORAL Take 1 Tab by mouth daily. * ketoconazole 2 % topical cream  
Commonly known as:  NIZORAL Apply  to affected area two (2) times a day. metFORMIN 500 mg tablet Commonly known as:  GLUCOPHAGE Take 1 Tab by mouth two (2) times daily (with meals). multivitamin tablet Commonly known as:  ONE A DAY Take 1 Tab by mouth daily. mupirocin 2 % ointment Commonly known as:  [facility] Apply  to affected area daily. omega-3 fatty acids-vitamin e 1,000 mg Cap Take 1 Cap by mouth. PROBIOTIC 4X 10-15 mg Tbec Generic drug:  B.infantis-B.ani-B.long-B.bifi Take  by mouth. topiramate 25 mg tablet Commonly known as:  TOPAMAX Take 1 Tab by mouth two (2) times a day. triamcinolone acetonide 0.5 % ointment Commonly known as:  KENALOG Apply  to affected area two (2) times a day. use thin layer to eczema on hands as needed * Notice: This list has 2 medication(s) that are the same as other medications prescribed for you. Read the directions carefully, and ask your doctor or other care provider to review them with you. Prescriptions Sent to Pharmacy Refills  
 triamcinolone acetonide (KENALOG) 0.5 % ointment 0 Sig: Apply  to affected area two (2) times a day. use thin layer to eczema on hands as needed  Class: Normal  
 Pharmacy: [facility] [place] [address] [facility]  [address] Ph #: [phone] Route: Topical  
 cyclobenzaprine (FLEXERIL) 10 mg tablet 1 Sig: Take 0.5-1 Tabs by mouth nightly as needed for Muscle Spasm(s) (nocturnal leg spasms). Class: Normal  
 Pharmacy: [facility] [address] Ph #: [phone] Route: Oral  
  
Patient Instructions AFTER VISIT INSTRUCTIONS You can use the steroid ointment on your hands. You can also try Gloves in a Bottle lotion. Okay to use Flexeril (muscle relaxer) as needed at bedtime for muscle spasms in your legs. Okay to call for ibuprofen refill if seen within a year at this office. Call and request an earlier appointment if you have any new questions or concerns. LAB HOURS AT [facility] Monday-Friday 7a-3p Closed on holidays TESTING RESULTS If your testing results require a change in therapy or additional evaluation attempts will be made to contact you through [employer] if you are active or by phone. Normal results will be released to [employer] or sent by [address] mail. Results of tests performed at a [facility] facility will not be available for review in [address] If you need assistance with accessing your [employer] account, you can call the [address] [facility] at #[phone]. STAY UP  [address] Please review the following recommendations and if you are not sure that your healthcare is up to date, ask your provider at your next scheduled office visit. Health Maintenance Due Topic Date Due  
 DTaP/Tdap/Td  (1 - Tdap) 06/19/2001  Cervical Cancer Screening  06/19/2001  Flu Vaccine  08/01/2017 MEDICATION REFILLS   
 
-- Medication refills should be requested by calling your pharmacy during regular office hours. Allow at least 2 business days for processing. Refills will not be provided by the after hours answering service/on call provider. HOLIDAY CLOSURES:  
 
The office is closed on six major holidays each year:  New Year's Day, July 4th, Memorial Day, Labor Day, Thanksgiving, and [place] Day. Lab and X-ray services are not available on those days. MAINTAIN A HEALTHY WEIGHT AND BODY MASS INDEX (BMI) Body mass index is 41.7 kg/(m^2). Body Mass Index (a calculation of weight compared to height) is often used to classify weight: 
 BMI below 18.5 - underweight BMI of 18.5 to 24.9  - normal 
 BMI of 25 to 29.9 - overweight BMI of 30 or higher - obese BMI of 40 or higher - morbidly obese If your BMI is 25 or higher, weight loss is recommended. 1)  Change your diet to eliminate or significantly reduce processed or refined carbohydrates (products made from oats, wheat, corn, maize, rye, sorghum, spelt, or barley, that have been processed by a food  so that the whole grain is no longer intact). These include bread products (bread, buns, pizza dough, etc), bakery items (cookies, cakes, donuts, muffins, pastries, bagels, baked desserts, etc), pancakes, waffles, cereals, and crackers. -- Ideally, diet for weight loss should be 1/3 lean proteins and 2/3 low glycemic impact vegetables and fruits. Avoid starchy or high glycemic impact plants such as lentils, root vegetables (potatoes, yams, sweet potatoes, grains, potatoes, taro, cassava, arrowroot), and tropical fruits such as pineapples, bananas, kiwis, papayas, mangos, plantains, and starchy plants such as butternut squash. 2)   Adequate water intake to keep urine clear to light yellow 3)   Minimum 30 minutes of cardiovascular exercise daily (or 3.5 hours of cardiovascular exercise per week). Additional time doing resistance or strength training is recommended to build muscle, reduce insulin sensitivity and increase resting fat & calorie burning capacity. Introducing [facility]! [facility] introduces [employer] patient portal. Now you can access parts of your medical record, email your doctor's office, and request medication refills online. 1. In your internet browser, go to [email or web address] 2. Click on the First Time User? Click Here link in the Sign In box. You will see the New Member Sign Up page. 3. Enter your [employer] Access Code exactly as it appears below. You will not need to use this code after youve completed the sign-up process. If you do not sign up before the expiration date, you must request a new code. · [employer] Access Code: [email or web address] Expires: 6/25/2018  8:42 AM 
 
4. Enter the last four digits of your Social Security Number (xxxx) and Date of Birth (mm/dd/yyyy) as indicated and click Submit. You will be taken to the next sign-up page. 5. Create a [employer]ri ID. This will be your [employer] login ID and cannot be changed, so think of one that is secure and easy to remember. 6. Create a [employer] password. You can change your password at any time. 7. Enter your Password Reset Question and Answer. This can be used at a later time if you forget your password. 8. Enter your e-mail address. You will receive e-mail notification when new information is available in [address] 9. Click Sign Up. You can now view and download portions of your medical record. 10. Click the Download Summary menu link to download a portable copy of your medical information. If you have questions, please visit the Frequently Asked Questions section of the [employer] website. Remember, [employer] is NOT to be used for urgent needs. For medical emergencies, dial 911. Now available from your iPhone and Android! Please provide this summary of care documentation to your next provider. Your primary care clinician is listed as Madeleine Meyer. If you have any questions after today's visit, please call [phone].

## 2018-03-27 NOTE — PATIENT INSTRUCTIONS
AFTER VISIT INSTRUCTIONS     You can use the steroid ointment on your hands. You can also try Gloves in a Bottle lotion. Okay to use Flexeril (muscle relaxer) as needed at bedtime for muscle spasms in your legs. Okay to call for ibuprofen refill if seen within a year at this office. Call and request an earlier appointment if you have any new questions or concerns. LAB HOURS AT [facility]     Monday-Friday 7a-3p  Closed on holidays    TESTING RESULTS     If your testing results require a change in therapy or additional evaluation attempts will be made to contact you through [employer] if you are active or by phone. Normal results will be released to [employer] or sent by [address] mail. Results of tests performed at a [facility] facility will not be available for review in [address] If you need assistance with accessing your [employer] account, you can call the [address] [place] [address] at #[phone]. STAY UP TO DATE WITH YOUR PREVENTIVE HEALTH   Please review the following recommendations and if you are not sure that your healthcare is up to date, ask your provider at your next scheduled office visit. Health Maintenance Due   Topic Date Due    DTaP/Tdap/Td  (1 - Tdap) 06/19/2001    Cervical Cancer Screening  06/19/2001    Flu Vaccine  08/01/2017        MEDICATION REFILLS      -- Medication refills should be requested by calling your pharmacy during regular office hours. Allow at least 2 business days for processing. Refills will not be provided by the after hours answering service/on call provider. HOLIDAY CLOSURES:     The office is closed on six major holidays each year:  New Year's Day, July 4th, Memorial Day, Labor Day, Thanksgiving, and [place] Day. Lab and X-ray services are not available on those days. MAINTAIN A HEALTHY WEIGHT AND BODY MASS INDEX (BMI)       Body mass index is 41.7 kg/(m^2).      Body Mass Index (a calculation of weight compared to height) is often used to classify weight:   BMI below 18.5 - underweight   BMI of 18.5 to 24.9  - normal   BMI of 25 to 29.9 - overweight   BMI of 30 or higher - obese   BMI of 40 or higher - morbidly obese    If your BMI is 25 or higher, weight loss is recommended. 1)  Change your diet to eliminate or significantly reduce processed or refined carbohydrates (products made from oats, wheat, corn, maize, rye, sorghum, spelt, or barley, that have been processed by a food  so that the whole grain is no longer intact). These include bread products (bread, buns, pizza dough, etc), bakery items (cookies, cakes, donuts, muffins, pastries, bagels, baked desserts, etc), pancakes, waffles, cereals, and crackers. -- Ideally, diet for weight loss should be 1/3 lean proteins and 2/3 low glycemic impact vegetables and fruits. Avoid starchy or high glycemic impact plants such as lentils, root vegetables (potatoes, yams, sweet potatoes, grains, potatoes, taro, cassava, arrowroot), and tropical fruits such as pineapples, bananas, kiwis, papayas, mangos, plantains, and starchy plants such as butternut squash. 2)   Adequate water intake to keep urine clear to light yellow    3)   Minimum 30 minutes of cardiovascular exercise daily (or 3.5 hours of cardiovascular exercise per week). Additional time doing resistance or strength training is recommended to build muscle, reduce insulin sensitivity and increase resting fat & calorie burning capacity.

## 2018-04-10 DIAGNOSIS — L30.9 DERMATITIS: ICD-10-CM

## 2018-04-11 RX ORDER — KETOCONAZOLE 20 MG/G
CREAM TOPICAL
Qty: 30 G | Refills: 0 | Status: SHIPPED | OUTPATIENT
Start: 2018-04-11 | End: 2018-08-29

## 2018-07-23 DIAGNOSIS — M79.644 PAIN IN FINGER OF RIGHT HAND: ICD-10-CM

## 2018-07-23 RX ORDER — IBUPROFEN 600 MG/1
600 TABLET ORAL
Qty: 60 TAB | Refills: 2 | Status: CANCELLED | OUTPATIENT
Start: 2018-07-23

## 2018-07-24 RX ORDER — IBUPROFEN 800 MG/1
800 TABLET ORAL
Qty: 40 TAB | Refills: 0 | Status: SHIPPED | OUTPATIENT
Start: 2018-07-24 | End: 2019-09-13 | Stop reason: SDUPTHER

## 2018-07-24 NOTE — TELEPHONE ENCOUNTER
Pt called to get a refill on ibuprofen 800 not 600 had surgery on your left ankle on Thursday and needs pills badly is in a lot of pain said she called yesterday I told her the dr's have up to 72 hrs to respond. ...  She responded \"she just needs something for the pain and swelling please she needs it\" she dosent have any more meds

## 2018-07-24 NOTE — TELEPHONE ENCOUNTER
She should be addressing post-op pain and swelling with surgeon. I will refill ibuprofen for pain (she must take it with food to avoid stomach irritation), but elevation and ice are probably going to be more effective at lowering post-op swelling than any medication.

## 2018-08-21 ENCOUNTER — TELEPHONE (OUTPATIENT)
Dept: FAMILY MEDICINE CLINIC | Age: 38
End: 2018-08-21

## 2018-08-29 ENCOUNTER — OFFICE VISIT (OUTPATIENT)
Dept: FAMILY MEDICINE CLINIC | Age: 38
End: 2018-08-29

## 2018-08-29 VITALS
TEMPERATURE: 96.9 F | SYSTOLIC BLOOD PRESSURE: 120 MMHG | BODY MASS INDEX: 41.48 KG/M2 | HEART RATE: 86 BPM | RESPIRATION RATE: 16 BRPM | OXYGEN SATURATION: 97 % | WEIGHT: 249 LBS | DIASTOLIC BLOOD PRESSURE: 82 MMHG | HEIGHT: 65 IN

## 2018-08-29 DIAGNOSIS — M25.531 RIGHT WRIST PAIN: Primary | ICD-10-CM

## 2018-08-29 DIAGNOSIS — Z28.21 INFLUENZA VACCINATION DECLINED: ICD-10-CM

## 2018-08-29 RX ORDER — DICLOFENAC SODIUM 10 MG/G
2 GEL TOPICAL 4 TIMES DAILY
Qty: 100 G | Refills: 1 | Status: SHIPPED | OUTPATIENT
Start: 2018-08-29 | End: 2018-11-07

## 2018-08-29 RX ORDER — TRIAMCINOLONE ACETONIDE 40 MG/ML
10 INJECTION, SUSPENSION INTRA-ARTICULAR; INTRAMUSCULAR ONCE
Qty: 0.25 ML | Refills: 0
Start: 2018-08-29 | End: 2018-08-29

## 2018-08-29 NOTE — PROGRESS NOTES
[employer] [facility] Consultation Note Leslee Tong is a 45 y.o. female (: 1980) presenting to obtain consultative services regarding: Chief Complaint Patient presents with  Wrist Pain rt wrist injection PCP: Regina Urias MD 
Requesting provider: Regina Urias MD 
 
Assessment/Plan: 1. Right wrist pain Initial encounter. DDx: wrist sprain vs early arthritis vs capsulitis. Amenable to trial of steroid injection today as well as continued ice PRN and PRN topical NSAID. Future considerations: patient is aware that if not improved, may need XR and referral to ortho hand - TRIAMCINOLONE ACETONIDE INJ 
- triamcinolone acetonide (KENALOG) 40 mg/mL injection; 0.25 mL by Intra artICUlar route once for 1 dose. Dispense: 0.25 mL; Refill: 0 
-  - DRAIN/INJECT INTERMEDIATE JOINT/BURSA 2. Influenza vaccination declined Orders Placed This Encounter  TRIAMCINOLONE ACETONIDE INJ Order Specific Question:   Charge Quantity? Answer:   1 Order Specific Question:   Dose Answer:   10 mg  
  Order Specific Question:   Site Answer:   RIGHT WRIST Order Specific Question:   Expiration Date Answer:   2020 Order Specific Question:   Lot# Answer:   [id number] Order Specific Question:    Answer:   [employer] Order Specific Question:   Perfomed by/Witnessed by: Answer:   Roxana Alvarado LPN Order Specific Question:   NDC# Answer:   [address]-5   - DRAIN/INJECT INTERMEDIATE JOINT/BURSA  triamcinolone acetonide (KENALOG) 40 mg/mL injection Si.25 mL by Intra artICUlar route once for 1 dose. Dispense:  0.25 mL Refill:  0  
 diclofenac (VOLTAREN) 1 % gel Sig: Apply 2 g to affected area four (4) times daily. Dispense:  100 g Refill:  1 Spent 25min face-to-face, >50% spent on counseling & patient education. Overdue HM items: Ms. Holder All has a reminder for a \"due or due soon\" health maintenance. I have asked that she contact her primary care provider for follow-up on this health maintenance. Management plan & patient instructions discussed with Sedrick Smith, who voiced understanding. Routed information to requesting provider in shared medical record. Thank you for the opportunity to participate in the care of this patient. If any questions or concerns at all, please feel free to contact me. This document may have been created with the aid of dictation software. Text may contain errors, particularly phonetic errors. Michelle Fajardo MD 
Internal Medicine, Family Medicine & Sports Medicine 8/29/2018, 8:07 AM 
 
Patient Instructions (provided in AVS): To Do: · Use ice regularly to the area, 20min each time to help control the swelling · May use the topical voltaren gel (think \"ibuprofen through the skin\") as needed as well Notes from your doctor: · If we don't get long lasting relief from this injection, let me know. We then will need to get an x-ray, and consider getting you back to an ortho hand specialist. 
 
 
Joint Injections: Care Instructions History:  
 
I was asked to provide consultative services by Michael Mariscal MD for advice/opinion related to evaluating Chief Complaint Patient presents with  Wrist Pain rt wrist injection GINETTE nicholson and . 
Hx of L wrist pain s/p steroid injection by Dr. Spencer Landon many years ago. Presents for slowly worsening dorsal wrist pain on the ulnar aspect worse with activity, but also more stiffness with rest.  Associated with some diffuse swelling, and occasionally pain radiates proximally to 1/2 up the forearm. Denies any redness. Denies any specific inciting trauma. Is associated with slightly weaker  strength than prior. Least pain over the last 2 weeks has been 1-2/10. Worst pain over the last 2 weeks has been 10/10 - some seconds if tweaked it the wrong way. Past Medical History:  
Diagnosis Date  BMI 39.0-39.9,adult 2/4/2016  Restless sleeper 2/4/2016  Vitamin D deficiency Past Surgical History:  
Procedure Laterality Date  HX ANKLE FRACTURE TX    
 right  HX ANKLE FRACTURE TX Left 07/2018  
 ankle surgery  HX APPENDECTOMY  HX CHOLECYSTECTOMY    
 
 reports that she has never smoked. She has never used smokeless tobacco. She reports that she drinks alcohol. She reports that she does not use illicit drugs. Family History Problem Relation Age of Onset  Cancer Sister   
  choriocarcinoma Allergies Allergen Reactions  Bactrim [Sulfamethoprim] Hives  Codeine Other (comments) Other reaction(s): other/intolerance Clammy and passed out Pass out, sweaty  Iodinated Contrast- Oral And Iv Dye Unknown (comments)  Morphine Itching and Hives Other reaction(s): mild rash/itching Hives Problem List:  
  
Patient Active Problem List  
 Diagnosis  Obesity, morbid ([address]  History of orthopedic surgery  Decreased range of motion of finger  Left wrist pain  Dermatitis  Pain in finger of right hand  Acute bilateral low back pain without sciatica  Urinary frequency  Vitamin D deficiency  BMI 39.0-39.9,adult  Restless sleeper  Influenza vaccination declined Medications:  
 
Current Outpatient Prescriptions on File Prior to Visit Medication Sig Dispense Refill  ibuprofen (MOTRIN) 800 mg tablet Take 1 Tab by mouth every eight (8) hours as needed for Pain (Take with food). 40 Tab 0  
 ketoconazole (NIZORAL) 2 % topical cream APPLY EXTERNALLY TO THE AFFECTED AREA TWICE DAILY 30 g 0  
 triamcinolone acetonide (KENALOG) 0.5 % ointment Apply  to affected area two (2) times a day.  use thin layer to eczema on hands as needed 30 g 0  
  cyclobenzaprine (FLEXERIL) 10 mg tablet Take 0.5-1 Tabs by mouth nightly as needed for Muscle Spasm(s) (nocturnal leg spasms). 20 Tab 1  
 mupirocin (BACTROBAN) 2 % ointment Apply  to affected area daily. 22 g 0  
 multivitamin (ONE A DAY) tablet Take 1 Tab by mouth daily.  B.infantis-B.ani-B.long-B.bifi (PROBIOTIC 4X) 10-15 mg TbEC Take  by mouth.  ketoconazole (NIZORAL) 200 mg tablet Take 1 Tab by mouth daily. 2 Tab 0  
 fluticasone (FLONASE) 50 mcg/actuation nasal spray 2 Sprays by Both Nostrils route daily. 1 Bottle 0  
 ergocalciferol (ERGOCALCIFEROL) 50,000 unit capsule Take 1 Cap by mouth every seven (7) days. 12 Cap 1  
 metFORMIN (GLUCOPHAGE) 500 mg tablet Take 1 Tab by mouth two (2) times daily (with meals). 180 Tab 1  
 topiramate (TOPAMAX) 25 mg tablet Take 1 Tab by mouth two (2) times a day. 180 Tab 0  
 omega-3 fatty acids-vitamin e 1,000 mg cap Take 1 Cap by mouth.  cyanocobalamin 1,000 mcg tablet Take 1,000 mcg by mouth daily. No current facility-administered medications on file prior to visit. Review of Systems:  
 
Review of Systems Musculoskeletal: Positive for joint pain. Negative for neck pain. Skin: Negative for rash. Neurological: Negative for tingling, tremors, sensory change and focal weakness. Physical Assessment:  
VS:   
Vitals:  
 08/29/18 0802 BP: 129/90 Pulse: 86 Resp: 16 Temp: 96.9 °F (36.1 °C) TempSrc: Oral  
SpO2: 97% Weight: 249 lb (112.9 kg) Height: 5' 5\" (1.651 m) PainSc:   0 - No pain LMP: 08/26/2018 Physical Exam  
Constitutional: She is oriented to person, place, and time. She appears well-developed and well-nourished. No distress. + obese HENT:  
Head: Normocephalic and atraumatic. Eyes: EOM are normal.  
Neck: Neck supple. Pulmonary/Chest: Effort normal.  
Musculoskeletal:  
     Right shoulder: She exhibits normal range of motion and no tenderness. Left shoulder: She exhibits normal range of motion and no tenderness. Right elbow: She exhibits normal range of motion and no swelling. Tenderness found. No radial head, no medial epicondyle (mild), no lateral epicondyle (mild) and no olecranon process tenderness noted. Left elbow: She exhibits normal range of motion and no swelling. No tenderness found. Cervical back: She exhibits normal range of motion, no tenderness and no bony tenderness. Right hand: She exhibits bony tenderness (nontender TFCC; + point tenderness just distal to TFCC, between the hamate / lunate/ triquetrum) and swelling. She exhibits normal range of motion and normal capillary refill. Normal sensation noted. Decreased strength (mildly decreased  strength) noted. Left hand: She exhibits normal range of motion, no bony tenderness and normal capillary refill. Normal sensation noted. Normal strength noted. Hands: 
Neurological: She is alert and oriented to person, place, and time. Coordination normal.  
Skin: Skin is warm and dry. She is not diaphoretic. Psychiatric: She has a normal mood and affect. Her behavior is normal. Thought content normal.  
Nursing note reviewed. Recent Labs & Imaging: XR Results (maximum last 3): Results from Appointment encounter on 10/31/16 XR HAND RT MIN 3 V Narrative Right hand 3 separate views History: Jammed injury to right hand 2 weeks ago with right middle finger pain. There is no evidence of fracture or focal bony abnormality. Impression Impression: 
 
Normal. 
   
 
 
 
Procedure Note:  
Right Wrist Joint Corticosteroid Injection Written informed consent signed after discussing the risks & benefits as well as alternative treatments with Abhi Oleary.  The risks associated with corticosteroid injection include but are not limited to bleeding, infection, synovitis (flare reaction), tendon rupture, skin color changes, and fat atrophy. 
 
 --------------------------Timeout Performed Prior To Procedure -------------------------- Chart reviewed for the following: 
Chucho Roe MD, have reviewed the History, Physical and updated the Allergic reactions for [address] TIME OUT performed immediately prior to start of procedure: 
Chucho Roe MD, have performed the following reviews on [address] prior to the start of the procedure: 
         
* Patient was identified by name and date of birth * Agreement on procedure being performed was verified * Risks and Benefits explained to the patient * Procedure site verified and marked as necessary * Patient was positioned for comfort * Consent was signed and verified Time: 8:39 AM  
 
Date of procedure: 8/29/2018 Procedure performed by:  James Smith MD 
 
How tolerated by patient: tolerated the procedure well with no complications 
 
 ---------------------------------------------------------------------------------------------------------- Location: dorsal aspect of R wrist 
Skin Prep:  Betadine x 3 Anesthesia: ethyl chloride Needle used: 25g Approach: dorsal 
Position: seated, wrist fully supinated and slightly flexed Medications: 0.25ml (10mg) of Kenalog 40mg/ml, 0.25ml of 1% lidocaine, and 0.25ml of 0.5% Marcaine Outcome: Patient tolerated the procedure well with no immediate complications noted. The area was cleaned & dressed. Post-Procedure Pain Level: 2 / right wrist 
 
Instructions: Patient instructed to rest the joint and apply ice as needed for 15 minutes every 2-3 hours for the next 24 hours, and to limit activity involving the area treated for the next 48 hours. Also instructed to call if there is increased pain, swelling or if a fever develops.

## 2018-08-29 NOTE — MR AVS SNAPSHOT
Cristian Ego 
 
 
 [address] [facility] [id number] [phone] Patient: Randall Lutz MRN: [id number] Visit Information Date & Time Provider Department Dept. Phone Encounter #  
 8/29/2018  8:00 AM Janessa Galeana MD [facility] [phone] [id number] Upcoming Health Maintenance Date Due DTaP/Tdap/Td series (1 - Tdap) 6/19/2001 PAP AKA CERVICAL CYTOLOGY 6/19/2001 Influenza Age 5 to Adult 8/1/2018 Allergies as of 8/29/2018  Review Complete On: 8/29/2018 By: Janessa Galeana MD  
  
 Severity Noted Reaction Type Reactions Bactrim [Sulfamethoprim]  08/29/2018    Hives Codeine  02/04/2016    Other (comments) Other reaction(s): other/intolerance Clammy and passed out Pass out, sweaty Iodinated Contrast- Oral And Iv Dye  06/26/2017    Unknown (comments) Morphine  02/04/2016    Itching, Hives Other reaction(s): mild rash/itching Hives Current Immunizations  Reviewed on 1/17/2017 No immunizations on file. Not reviewed this visit You Were Diagnosed With   
  
 Codes Comments Right wrist pain    -  Primary ICD-10-CM: M25.531 ICD-9-CM: 719.43 Influenza vaccination declined     ICD-10-CM: I95.20 ICD-9-CM: V64.06 Vitals BP Pulse Temp Resp Height(growth percentile) Weight(growth percentile) 120/82 (BP 1 Location: Left arm, BP Patient Position: Sitting) 86 96.9 °F (36.1 °C) (Oral) 16 5' 5\" (1.651 m) 249 lb (112.9 kg) LMP SpO2 BMI OB Status Smoking Status 08/26/2018 (Approximate) 97% 41.44 kg/m2 Having regular periods Never Smoker Vitals History BMI and BSA Data Body Mass Index Body Surface Area  
 41.44 kg/m 2 2.28 m 2 Preferred Pharmacy Pharmacy Name Phone [address] [facility], [address] [address] [phone] Your Updated Medication List  
  
   
This list is accurate as of 8/29/18  8:53 AM.  Always use your most recent med list.  
  
  
  
  
 diclofenac 1 % Gel Commonly known as:  VOLTAREN Apply 2 g to affected area four (4) times daily. ibuprofen 800 mg tablet Commonly known as:  MOTRIN Take 1 Tab by mouth every eight (8) hours as needed for Pain (Take with food). triamcinolone acetonide 40 mg/mL injection Commonly known as:  KENALOG  
0.25 mL by Intra artICUlar route once for 1 dose. Prescriptions Printed Refills  
 diclofenac (VOLTAREN) 1 % gel 1 Sig: Apply 2 g to affected area four (4) times daily. Class: Print Route: Topical  
  
We Performed the Following CA DRAIN/INJECT INTERMEDIATE JOINT/BURSA V0008131 CPT(R)] TRIAMCINOLONE ACETONIDE INJ [ \[facility]\""] Patient Instructions To Do: · Use ice regularly to the area, 20min each time to help control the swelling · May use the topical voltaren gel (think \"ibuprofen through the skin\") as needed as well Notes from your doctor: · If we don't get long lasting relief from this injection, let me know. We then will need to get an x-ray, and consider getting you back to an ortho hand specialist. 
 
 
  
Joint Injections: Care Instructions Your Care Instructions Joint injections are shots into a joint, such as the knee. They may be used to put in medicines, such as pain relievers. A corticosteroid, or steroid, shot is used to reduce inflammation in tendons or joints. It is often used to treat problems such as arthritis, tendinitis, and bursitis. Steroids can be injected directly into a painful, inflamed joint. They can also help reduce inflammation of a bursa. A bursa is a sac of fluid. It cushions and lubricates areas where tendons, ligaments, skin, muscles, or bones rub against each other.  
A steroid shot can sometimes help with short-term pain relief when other treatments haven't worked. If steroid shots help, pain may improve for weeks or months. Follow-up care is a key part of your treatment and safety. Be sure to make and go to all appointments, and call your doctor if you are having problems. It's also a good idea to know your test results and keep a list of the medicines you take. How can you care for yourself at home? · Put ice or a cold pack on the area for 10 to 20 minutes at a time. Put a thin cloth between the ice and your skin. · Ask your doctor if you can take an over-the-counter pain medicine, such as acetaminophen (Tylenol), ibuprofen (Advil, Motrin), or naproxen (Aleve). Be safe with medicines. Read and follow all instructions on the label. · Avoid strenuous activities for several days. In particular, avoid ones that put stress on the area where you got the shot. · If you have dressings over the area, keep them clean and dry. You may remove them when your doctor tells you to. When should you call for help? Call your doctor now or seek immediate medical care if: 
  · You have signs of infection, such as: 
¨ Increased pain, swelling, warmth, or redness. ¨ Red streaks leading from the site. ¨ Pus draining from the site. ¨ A fever.  
 Watch closely for changes in your health, and be sure to contact your doctor if you have any problems. Where can you learn more? Go to http://mario-felipe.info/. Enter N616 in the search box to learn more about \"Joint Injections: Care Instructions. \" Current as of: March 24, 2017 Content Version: 11.7 © 8885-5665 "[employer]. Care instructions adapted under license by [employer] (which disclaims liability or warranty for this information). If you have questions about a medical condition or this instruction, always ask your healthcare professional. [address] any warranty or liability for your use of this information. Introducing [facility]! Miryam Gleason introduces Urbasolar patient portal. Now you can access parts of your medical record, email your doctor's office, and request medication refills online. 1. In your internet browser, go to [email or web address] 2. Click on the First Time User? Click Here link in the Sign In box. You will see the New Member Sign Up page. 3. Enter your Urbasolar Access Code exactly as it appears below. You will not need to use this code after youve completed the sign-up process. If you do not sign up before the expiration date, you must request a new code. · Urbasolar Access Code: [email or web address] Expires: 11/27/2018  8:53 AM 
 
4. Enter the last four digits of your Social Security Number (xxxx) and Date of Birth (mm/dd/yyyy) as indicated and click Submit. You will be taken to the next sign-up page. 5. Create a Urbasolar ID. This will be your Urbasolar login ID and cannot be changed, so think of one that is secure and easy to remember. 6. Create a Urbasolar password. You can change your password at any time. 7. Enter your Password Reset Question and Answer. This can be used at a later time if you forget your password. 8. Enter your e-mail address. You will receive e-mail notification when new information is available in [address] 9. Click Sign Up. You can now view and download portions of your medical record. 10. Click the Download Summary menu link to download a portable copy of your medical information. If you have questions, please visit the Frequently Asked Questions section of the Urbasolar website. Remember, Urbasolar is NOT to be used for urgent needs. For medical emergencies, dial 911. Now available from your iPhone and Android! Please provide this summary of care documentation to your next provider. Your primary care clinician is listed as Gladys Oliva. If you have any questions after today's visit, please call [phone].

## 2018-08-29 NOTE — PATIENT INSTRUCTIONS
To Do: · Use ice regularly to the area, 20min each time to help control the swelling · May use the topical voltaren gel (think \"ibuprofen through the skin\") as needed as well Notes from your doctor: · If we don't get long lasting relief from this injection, let me know. We then will need to get an x-ray, and consider getting you back to an ortho hand specialist. 
 
 
  
Joint Injections: Care Instructions Your Care Instructions Joint injections are shots into a joint, such as the knee. They may be used to put in medicines, such as pain relievers. A corticosteroid, or steroid, shot is used to reduce inflammation in tendons or joints. It is often used to treat problems such as arthritis, tendinitis, and bursitis. Steroids can be injected directly into a painful, inflamed joint. They can also help reduce inflammation of a bursa. A bursa is a sac of fluid. It cushions and lubricates areas where tendons, ligaments, skin, muscles, or bones rub against each other. A steroid shot can sometimes help with short-term pain relief when other treatments haven't worked. If steroid shots help, pain may improve for weeks or months. Follow-up care is a key part of your treatment and safety. Be sure to make and go to all appointments, and call your doctor if you are having problems. It's also a good idea to know your test results and keep a list of the medicines you take. How can you care for yourself at home? · Put ice or a cold pack on the area for 10 to 20 minutes at a time. Put a thin cloth between the ice and your skin. · Ask your doctor if you can take an over-the-counter pain medicine, such as acetaminophen (Tylenol), ibuprofen (Advil, Motrin), or naproxen (Aleve). Be safe with medicines. Read and follow all instructions on the label. · Avoid strenuous activities for several days. In particular, avoid ones that put stress on the area where you got the shot. · If you have dressings over the area, keep them clean and dry. You may remove them when your doctor tells you to. When should you call for help? Call your doctor now or seek immediate medical care if: 
  · You have signs of infection, such as: 
¨ Increased pain, swelling, warmth, or redness. ¨ Red streaks leading from the site. ¨ Pus draining from the site. ¨ A fever.  
 Watch closely for changes in your health, and be sure to contact your doctor if you have any problems. Where can you learn more? Go to http://mario-felipe.info/. Enter N616 in the search box to learn more about \"Joint Injections: Care Instructions. \" Current as of: March 24, 2017 Content Version: 11.7 © 8466-1050 [employer]. Care instructions adapted under license by [employer] (which disclaims liability or warranty for this information). If you have questions about a medical condition or this instruction, always ask your healthcare professional. [place] [address] any warranty or liability for your use of this information.

## 2018-08-29 NOTE — PROGRESS NOTES
Katie Bear is a 45 y.o. female (: 1980) presenting to address: Chief Complaint Patient presents with  Wrist Pain rt wrist injection Patient DECLINED flu vaccine. Vitals:  
 18 0802 BP: 129/90 Pulse: 86 Resp: 16 Temp: 96.9 °F (36.1 °C) TempSrc: Oral  
SpO2: 97% Weight: 249 lb (112.9 kg) Height: 5' 5\" (1.651 m) PainSc:   0 - No pain LMP: 2018 Hearing/Vision: No exam data present Learning Assessment:  
 
Learning Assessment 2016 PRIMARY LEARNER Patient HIGHEST LEVEL OF EDUCATION - PRIMARY LEARNER  > 4 YEARS OF COLLEGE  
BARRIERS PRIMARY LEARNER NONE  
CO-LEARNER CAREGIVER No  
PRIMARY LANGUAGE ENGLISH  NEED No  
LEARNER PREFERENCE PRIMARY READING  
LEARNING SPECIAL TOPICS none ANSWERED BY patient RELATIONSHIP SELF  
ASSESSMENT COMMENT n/a Depression Screening: PHQ over the last two weeks 2018 Little interest or pleasure in doing things Not at all Feeling down, depressed, irritable, or hopeless Not at all Total Score PHQ 2 0 Fall Risk Assessment:  
No flowsheet data found. Abuse Screening:  
 
Abuse Screening Questionnaire 3/27/2018 Do you ever feel afraid of your partner?  Are you in a relationship with someone who physically or mentally threatens you?  Is it safe for you to go home? [facility] Coordination of Care Questionaire: 1. Have you been to the ER, urgent care clinic since your last visit? Hospitalized since your last visit? NO 
 
2. Have you seen or consulted any other health care providers outside of the [address] [place] [address] since your last visit? Include any pap smears or colon screening. YES General Surgeon in [place] Advanced Directive: 1. Do you have an Advanced Directive? NO 
 
2. Would you like information on Advanced Directives?  NO

## 2018-10-28 DIAGNOSIS — L30.9 ECZEMA OF HAND: ICD-10-CM

## 2018-10-29 RX ORDER — TRIAMCINOLONE ACETONIDE 5 MG/G
OINTMENT TOPICAL
Qty: 30 G | Refills: 0 | Status: SHIPPED | OUTPATIENT
Start: 2018-10-29

## 2018-11-07 ENCOUNTER — OFFICE VISIT (OUTPATIENT)
Dept: FAMILY MEDICINE CLINIC | Age: 38
End: 2018-11-07

## 2018-11-07 VITALS
DIASTOLIC BLOOD PRESSURE: 91 MMHG | HEIGHT: 65 IN | HEART RATE: 71 BPM | RESPIRATION RATE: 18 BRPM | OXYGEN SATURATION: 96 % | WEIGHT: 247.8 LBS | TEMPERATURE: 98.3 F | SYSTOLIC BLOOD PRESSURE: 130 MMHG | BODY MASS INDEX: 41.29 KG/M2

## 2018-11-07 DIAGNOSIS — J06.9 VIRAL URI WITH COUGH: Primary | ICD-10-CM

## 2018-11-07 DIAGNOSIS — T78.40XA ALLERGIC REACTION, INITIAL ENCOUNTER: ICD-10-CM

## 2018-11-07 RX ORDER — PREDNISONE 20 MG/1
TABLET ORAL
COMMUNITY
End: 2019-04-29 | Stop reason: ALTCHOICE

## 2018-11-07 RX ORDER — BENZONATATE 200 MG/1
CAPSULE ORAL
Qty: 30 CAP | Refills: 1 | Status: SHIPPED | OUTPATIENT
Start: 2018-11-07 | End: 2021-01-22

## 2018-11-07 NOTE — LETTER
NOTIFICATION RETURN TO WORK / SCHOOL 
 
11/7/2018 10:34 AM 
 
Ms. Reno Patterson [address] [place] [phone] To Whom It May Concern: 
 
Reno Patterson is currently under the care of [address] [place] [address]. She will return to work/school on: 11/7/2018 If there are questions or concerns please have the patient contact our office. Sincerely, Akhil Austin MD

## 2018-11-07 NOTE — PROGRESS NOTES
Haris Melton is a 45 y.o. female (: 1980) presenting to address: Chief Complaint Patient presents with  
 Other Here for left ankle was red and per patient looked infected. Also has a cough. Pt declined flu vaccine. There were no vitals filed for this visit. Hearing/Vision: No exam data present Learning Assessment:  
 
Learning Assessment 2016 PRIMARY LEARNER Patient HIGHEST LEVEL OF EDUCATION - PRIMARY LEARNER  > 4 YEARS OF COLLEGE  
BARRIERS PRIMARY LEARNER NONE  
CO-LEARNER CAREGIVER No  
PRIMARY LANGUAGE ENGLISH  NEED No  
LEARNER PREFERENCE PRIMARY READING  
LEARNING SPECIAL TOPICS none ANSWERED BY patient RELATIONSHIP SELF  
ASSESSMENT COMMENT n/a Depression Screening: PHQ over the last two weeks 2018 Little interest or pleasure in doing things Not at all Feeling down, depressed, irritable, or hopeless Not at all Total Score PHQ 2 0 Fall Risk Assessment:  
No flowsheet data found. Abuse Screening:  
 
Abuse Screening Questionnaire 3/27/2018 Do you ever feel afraid of your partner? Pauline Ramirez Are you in a relationship with someone who physically or mentally threatens you? Pauline Ramirez Is it safe for you to go home? [facility] Coordination of Care Questionaire: 1. Have you been to the ER, urgent care clinic since your last visit? Hospitalized since your last visit? NO 
 
2. Have you seen or consulted any other health care providers outside of the [address] [place] [address] since your last visit? Include any pap smears or colon screening. NO Advanced Directive: 1. Do you have an Advanced Directive? NO 
 
2. Would you like information on Advanced Directives?  NO

## 2018-11-07 NOTE — PATIENT INSTRUCTIONS
Benzonatate perls - Take one capsule 3 times daily if needed for cough Increase fluids, rest, OTC analgesics and antihistamines as needed. Follow up for new symptoms, worsening symptoms or failure to improve. Complete prednisone, continue Benadryl as needed Upper Respiratory Infection (Cold): Care Instructions Your Care Instructions An upper respiratory infection, or URI, is an infection of the nose, sinuses, or throat. URIs are spread by coughs, sneezes, and direct contact. The common cold is the most frequent kind of URI. The flu and sinus infections are other kinds of URIs. Almost all URIs are caused by viruses. Antibiotics won't cure them. But you can treat most infections with home care. This may include drinking lots of fluids and taking over-the-counter pain medicine. You will probably feel better in 4 to 10 days. The doctor has checked you carefully, but problems can develop later. If you notice any problems or new symptoms, get medical treatment right away. Follow-up care is a key part of your treatment and safety. Be sure to make and go to all appointments, and call your doctor if you are having problems. It's also a good idea to know your test results and keep a list of the medicines you take. How can you care for yourself at home? · To prevent dehydration, drink plenty of fluids, enough so that your urine is light yellow or clear like water. Choose water and other caffeine-free clear liquids until you feel better. If you have kidney, heart, or liver disease and have to limit fluids, talk with your doctor before you increase the amount of fluids you drink. · Take an over-the-counter pain medicine, such as acetaminophen (Tylenol), ibuprofen (Advil, Motrin), or naproxen (Aleve). Read and follow all instructions on the label. · Before you use cough and cold medicines, check the label. These medicines may not be safe for young children or for people with certain health problems. · Be careful when taking over-the-counter cold or flu medicines and Tylenol at the same time. Many of these medicines have acetaminophen, which is Tylenol. Read the labels to make sure that you are not taking more than the recommended dose. Too much acetaminophen (Tylenol) can be harmful. · Get plenty of rest. 
· Do not smoke or allow others to smoke around you. If you need help quitting, talk to your doctor about stop-smoking programs and medicines. These can increase your chances of quitting for good. When should you call for help? Call 911 anytime you think you may need emergency care. For example, call if: 
  · You have severe trouble breathing.  
 Call your doctor now or seek immediate medical care if: 
  · You seem to be getting much sicker.  
  · You have new or worse trouble breathing.  
  · You have a new or higher fever.  
  · You have a new rash.  
 Watch closely for changes in your health, and be sure to contact your doctor if: 
  · You have a new symptom, such as a sore throat, an earache, or sinus pain.  
  · You cough more deeply or more often, especially if you notice more mucus or a change in the color of your mucus.  
  · You do not get better as expected. Where can you learn more? Go to http://mario-felipe.info/. Enter M102 in the search box to learn more about \"Upper Respiratory Infection (Cold): Care Instructions. \" Current as of: December 6, 2017 Content Version: 11.8 © 1179-8213 [employer]. Care instructions adapted under license by [employer] (which disclaims liability or warranty for this information). If you have questions about a medical condition or this instruction, always ask your healthcare professional. [place] [address] any warranty or liability for your use of this information.

## 2018-11-07 NOTE — PROGRESS NOTES
HISTORY OF PRESENT ILLNESS Sabino Marx is a 45 y.o. female. Ankle Pain The history is provided by the patient and medical records. This is a new problem. Cough Pertinent negatives include no shortness of breath. Patient Active Problem List  
Diagnosis Code  Vitamin D deficiency E55.9  BMI 39.0-39.9,adult Z68.39  
 Restless sleeper G47.9  Influenza vaccination declined Z28.21  
 Acute bilateral low back pain without sciatica M54.5  Urinary frequency R35.0  
 Pain in finger of right hand M79.644  Decreased range of motion of finger M25.649  Left wrist pain M25.532  Dermatitis L30.9  Obesity, morbid ([address] E66.01  
 History of orthopedic surgery Z98.890 Current Outpatient Medications:  
  predniSONE (DELTASONE) 20 mg tablet, Take  by mouth daily (with breakfast). , Disp: , Rfl:  
  diphenhydramine HCl (BENADRYL PO), Take  by mouth., Disp: , Rfl:  
  triamcinolone acetonide (KENALOG) 0.5 % ointment, APPLY TO THE AFFECTED AREA TWICE DAILY. USE THIN LAYER TO ECZEMA ON HANDS AS NEEDED, Disp: 30 g, Rfl: 0 
  ibuprofen (MOTRIN) 800 mg tablet, Take 1 Tab by mouth every eight (8) hours as needed for Pain (Take with food). , Disp: 40 Tab, Rfl: 0 Allergies Allergen Reactions  Bactrim [Sulfamethoprim] Hives  Codeine Other (comments) Other reaction(s): other/intolerance Clammy and passed out Pass out, sweaty  Ginger Other (comments) States body turned red.  Iodinated Contrast- Oral And Iv Dye Unknown (comments)  Morphine Itching and Hives Other reaction(s): mild rash/itching Hives  Sulfa (Sulfonamide Antibiotics) Other (comments) States body turned red. Review of Systems Constitutional: Negative for fever and weight loss. HENT: Positive for congestion. Negative for sore throat. Hoarse Respiratory: Positive for cough. Negative for shortness of breath and wheezing. Gastrointestinal: Negative for nausea and vomiting. Skin: Positive for rash (took a left over \"sulfa\" pill, then ate ginger candy 3 days ago, went to urgent care, treated with \"prednisone\" and benadryl  ). Old scar left lateral ankle from remote surgery looked red, resulted in her taking two doses of left over antibiotic, now resolved Visit Vitals BP (!) 130/91 (BP 1 Location: Left arm, BP Patient Position: Sitting) Pulse 71 Temp 98.3 °F (36.8 °C) (Oral) Resp 18 Ht 5' 5\" (1.651 m) Wt 247 lb 12.8 oz (112.4 kg) SpO2 96% BMI 41.24 kg/m² Physical Exam  
Constitutional: She is oriented to person, place, and time. She appears well-developed and well-nourished. HENT:  
Head: Normocephalic. Right Ear: Tympanic membrane and ear canal normal.  
Left Ear: Tympanic membrane and ear canal normal.  
Mouth/Throat: Oropharynx is clear and moist.  
Eyes: Conjunctivae and EOM are normal.  
Neck: Neck supple. Cardiovascular: Normal rate, regular rhythm and normal heart sounds. Pulmonary/Chest: Effort normal and breath sounds normal.  
Lymphadenopathy:  
  She has no cervical adenopathy. Neurological: She is alert and oriented to person, place, and time. Skin: Skin is warm and dry. No rash noted. Psychiatric: She has a normal mood and affect. Her behavior is normal.  
Nursing note and vitals reviewed. ASSESSMENT and PLAN 
  ICD-10-CM ICD-9-CM 1. Viral URI with cough J06.9 465.9 benzonatate (TESSALON) 200 mg capsule B97.89    
2. Allergic reaction, initial encounter T78.40XA 995.3 Benzonatate perls - Take one capsule 3 times daily if needed for cough Increase fluids, rest, OTC analgesics and antihistamines as needed. Follow up for new symptoms, worsening symptoms or failure to improve. Complete prednisone, continue Benadryl as needed

## 2018-12-14 DIAGNOSIS — M62.838 MUSCLE SPASM OF LEFT LOWER EXTREMITY: ICD-10-CM

## 2018-12-16 RX ORDER — CYCLOBENZAPRINE HCL 10 MG
TABLET ORAL
Qty: 20 TAB | Refills: 0 | OUTPATIENT
Start: 2018-12-16

## 2019-04-29 ENCOUNTER — OFFICE VISIT (OUTPATIENT)
Dept: FAMILY MEDICINE CLINIC | Age: 39
End: 2019-04-29

## 2019-04-29 VITALS
DIASTOLIC BLOOD PRESSURE: 72 MMHG | OXYGEN SATURATION: 95 % | WEIGHT: 257.2 LBS | RESPIRATION RATE: 18 BRPM | TEMPERATURE: 98.4 F | SYSTOLIC BLOOD PRESSURE: 118 MMHG | HEART RATE: 88 BPM | BODY MASS INDEX: 42.85 KG/M2 | HEIGHT: 65 IN

## 2019-04-29 DIAGNOSIS — J20.9 ACUTE TRACHEOBRONCHITIS: Primary | ICD-10-CM

## 2019-04-29 RX ORDER — METHYLPREDNISOLONE 4 MG/1
TABLET ORAL
Qty: 1 DOSE PACK | Refills: 0 | Status: SHIPPED | OUTPATIENT
Start: 2019-04-29 | End: 2019-10-07 | Stop reason: ALTCHOICE

## 2019-04-29 RX ORDER — AZITHROMYCIN 250 MG/1
TABLET, FILM COATED ORAL
Qty: 6 TAB | Refills: 0 | Status: SHIPPED | OUTPATIENT
Start: 2019-04-29 | End: 2019-05-04

## 2019-04-29 RX ORDER — ALBUTEROL SULFATE 90 UG/1
AEROSOL, METERED RESPIRATORY (INHALATION)
Qty: 1 INHALER | Refills: 2 | Status: SHIPPED | OUTPATIENT
Start: 2019-04-29 | End: 2021-01-22

## 2019-04-29 NOTE — PATIENT INSTRUCTIONS
Azithromycin 250 mg 2 tablets today then 1 tablet each day for 4 more days. Albuterol inhaler 2 puffs up to every 4 hours if needed for cough, wheezing, shortness of breath. Medrol Dosepak, follow Dosepak instructions. Follow-up for new symptoms worsening symptoms or failure to improve. Bronchitis: Care Instructions Your Care Instructions Bronchitis is inflammation of the bronchial tubes, which carry air to the lungs. The tubes swell and produce mucus, or phlegm. The mucus and inflamed bronchial tubes make you cough. You may have trouble breathing. Most cases of bronchitis are caused by viruses like those that cause colds. Antibiotics usually do not help and they may be harmful. Bronchitis usually develops rapidly and lasts about 2 to 3 weeks in otherwise healthy people. Follow-up care is a key part of your treatment and safety. Be sure to make and go to all appointments, and call your doctor if you are having problems. It's also a good idea to know your test results and keep a list of the medicines you take. How can you care for yourself at home? · Take all medicines exactly as prescribed. Call your doctor if you think you are having a problem with your medicine. · Get some extra rest. 
· Take an over-the-counter pain medicine, such as acetaminophen (Tylenol), ibuprofen (Advil, Motrin), or naproxen (Aleve) to reduce fever and relieve body aches. Read and follow all instructions on the label. · Do not take two or more pain medicines at the same time unless the doctor told you to. Many pain medicines have acetaminophen, which is Tylenol. Too much acetaminophen (Tylenol) can be harmful. · Take an over-the-counter cough medicine that contains dextromethorphan to help quiet a dry, hacking cough so that you can sleep. Avoid cough medicines that have more than one active ingredient. Read and follow all instructions on the label. · Breathe moist air from a humidifier, hot shower, or sink filled with hot water. The heat and moisture will thin mucus so you can cough it out. · Do not smoke. Smoking can make bronchitis worse. If you need help quitting, talk to your doctor about stop-smoking programs and medicines. These can increase your chances of quitting for good. When should you call for help? Call 911 anytime you think you may need emergency care. For example, call if: 
  · You have severe trouble breathing.  
 Call your doctor now or seek immediate medical care if: 
  · You have new or worse trouble breathing.  
  · You cough up dark brown or bloody mucus (sputum).  
  · You have a new or higher fever.  
  · You have a new rash.  
 Watch closely for changes in your health, and be sure to contact your doctor if: 
  · You cough more deeply or more often, especially if you notice more mucus or a change in the color of your mucus.  
  · You are not getting better as expected. Where can you learn more? Go to http://mario-felipe.info/. Enter H333 in the search box to learn more about \"Bronchitis: Care Instructions. \" Current as of: September 5, 2018 Content Version: 11.9 © 1659-6274 [employer]. Care instructions adapted under license by [employer] (which disclaims liability or warranty for this information). If you have questions about a medical condition or this instruction, always ask your healthcare professional. [place] [address] any warranty or liability for your use of this information.

## 2019-04-29 NOTE — PROGRESS NOTES
HISTORY OF PRESENT ILLNESS Shantell Weiss is a 45 y.o. female. Cold Symptoms The history is provided by the patient and medical records. Pertinent negatives include no chills, no sore throat, no shortness of breath and no wheezing. Mr#: [id number] Patient Active Problem List  
Diagnosis Code  Vitamin D deficiency E55.9  BMI 39.0-39.9,adult Z68.39  
 Restless sleeper G47.9  Influenza vaccination declined Z28.21  
 Acute bilateral low back pain without sciatica M54.5  Urinary frequency R35.0  
 Pain in finger of right hand M79.644  Decreased range of motion of finger M25.649  Left wrist pain M25.532  Dermatitis L30.9  Obesity, morbid ([address] E66.01  
 History of orthopedic surgery Z98.890 Current Outpatient Medications:  
  predniSONE (DELTASONE) 20 mg tablet, Take  by mouth daily (with breakfast). , Disp: , Rfl:  
  diphenhydramine HCl (BENADRYL PO), Take  by mouth., Disp: , Rfl:  
  benzonatate (TESSALON) 200 mg capsule, Take one capsule 3 times daily if needed for cough, Disp: 30 Cap, Rfl: 1 
  triamcinolone acetonide (KENALOG) 0.5 % ointment, APPLY TO THE AFFECTED AREA TWICE DAILY. USE THIN LAYER TO ECZEMA ON HANDS AS NEEDED, Disp: 30 g, Rfl: 0 
  ibuprofen (MOTRIN) 800 mg tablet, Take 1 Tab by mouth every eight (8) hours as needed for Pain (Take with food). , Disp: 40 Tab, Rfl: 0 Allergies Allergen Reactions  Bactrim [Sulfamethoprim] Hives  Codeine Other (comments) Other reaction(s): other/intolerance Clammy and passed out Pass out, sweaty  Ginger Other (comments) States body turned red.  Iodinated Contrast- Oral And Iv Dye Unknown (comments)  Morphine Itching and Hives Other reaction(s): mild rash/itching Hives  Sulfa (Sulfonamide Antibiotics) Other (comments) States body turned red. Review of Systems Constitutional: Negative for chills and fever. HENT: Negative for sore throat. Respiratory: Positive for cough and sputum production (green). Negative for shortness of breath and wheezing. Skin: Negative for rash. Endo/Heme/Allergies: Positive for environmental allergies. Visit Vitals /72 (BP 1 Location: Left arm, BP Patient Position: Sitting) Pulse 88 Temp 98.4 °F (36.9 °C) (Oral) Resp 18 Ht 5' 5\" (1.651 m) Wt 257 lb 3.2 oz (116.7 kg) LMP 04/06/2019 SpO2 95% BMI 42.80 kg/m² Physical Exam  
Constitutional: She is oriented to person, place, and time. She appears well-developed and well-nourished. HENT:  
Head: Normocephalic. Right Ear: Tympanic membrane and ear canal normal.  
Left Ear: Tympanic membrane and ear canal normal.  
Mouth/Throat: Oropharynx is clear and moist.  
Eyes: Conjunctivae and EOM are normal.  
Neck: Neck supple. Cardiovascular: Normal rate, regular rhythm and normal heart sounds. Pulmonary/Chest: Effort normal and breath sounds normal.  
Tracheobronchitic sounding cough Lymphadenopathy:  
  She has no cervical adenopathy. Neurological: She is alert and oriented to person, place, and time. Skin: Skin is warm and dry. Psychiatric: She has a normal mood and affect. Her behavior is normal.  
Nursing note and vitals reviewed. ASSESSMENT and PLAN 
  ICD-10-CM ICD-9-CM 1. Acute tracheobronchitis J20.9 466.0 azithromycin (ZITHROMAX) 250 mg tablet  
   albuterol (PROVENTIL HFA, VENTOLIN HFA, PROAIR HFA) 90 mcg/actuation inhaler  
   methylPREDNISolone (MEDROL DOSEPACK) 4 mg tablet 2. Flu-like symptoms R68.89 780.99 CANCELED: AMB POC AYDEN INFLUENZA A/B TEST Azithromycin 250 mg 2 tablets today then 1 tablet each day for 4 more days. Albuterol inhaler 2 puffs up to every 4 hours if needed for cough, wheezing, shortness of breath. Medrol Dosepak, follow Dosepak instructions. Follow-up for new symptoms worsening symptoms or failure to improve.

## 2019-04-29 NOTE — PROGRESS NOTES
Manju Artis is a 45 y.o. female (: 1980) presenting to address: Chief Complaint Patient presents with  Cough  
  laryngytis; productive cough with green phlegm x 5 days; took Mucinex with little relief; worse at night Vitals:  
 19 1445 BP: 118/72 Pulse: 88 Resp: 18 Temp: 98.4 °F (36.9 °C) TempSrc: Oral  
SpO2: 95% Weight: 257 lb 3.2 oz (116.7 kg) Height: 5' 5\" (1.651 m) PainSc:   0 - No pain LMP: 2019 Hearing/Vision: No exam data present Learning Assessment:  
 
Learning Assessment 2016 PRIMARY LEARNER Patient HIGHEST LEVEL OF EDUCATION - PRIMARY LEARNER  > 4 YEARS OF COLLEGE  
BARRIERS PRIMARY LEARNER NONE  
CO-LEARNER CAREGIVER No  
PRIMARY LANGUAGE ENGLISH  NEED No  
LEARNER PREFERENCE PRIMARY READING  
LEARNING SPECIAL TOPICS none ANSWERED BY patient RELATIONSHIP SELF  
ASSESSMENT COMMENT n/a Depression Screening:  
 
3 most recent PHQ Screens 2019 Little interest or pleasure in doing things Not at all Feeling down, depressed, irritable, or hopeless Not at all Total Score PHQ 2 0 Fall Risk Assessment:  
No flowsheet data found. Abuse Screening:  
 
Abuse Screening Questionnaire 3/27/2018 Do you ever feel afraid of your partner? Genetta Croon Are you in a relationship with someone who physically or mentally threatens you? Genetta Littleon Is it safe for you to go home? [facility] Coordination of Care Questionaire: 1. Have you been to the ER, urgent care clinic since your last visit? Hospitalized since your last visit? YES-urgent care 3/10/19 for ear infections 2. Have you seen or consulted any other health care providers outside of the [facility] since your last visit? Include any pap smears or colon screening. NO Advanced Directive: 1. Do you have an Advanced Directive? NO 
 
2. Would you like information on Advanced Directives?  NO

## 2019-06-06 ENCOUNTER — OFFICE VISIT (OUTPATIENT)
Dept: FAMILY MEDICINE CLINIC | Age: 39
End: 2019-06-06

## 2019-06-06 VITALS
TEMPERATURE: 97.2 F | OXYGEN SATURATION: 99 % | SYSTOLIC BLOOD PRESSURE: 122 MMHG | HEIGHT: 65 IN | RESPIRATION RATE: 18 BRPM | DIASTOLIC BLOOD PRESSURE: 78 MMHG | WEIGHT: 256 LBS | BODY MASS INDEX: 42.65 KG/M2 | HEART RATE: 79 BPM

## 2019-06-06 DIAGNOSIS — R53.83 FATIGUE DUE TO DEPRESSION: ICD-10-CM

## 2019-06-06 DIAGNOSIS — F43.21 GRIEF ASSOCIATED WITH LOSS OF FETUS: ICD-10-CM

## 2019-06-06 DIAGNOSIS — Z87.59 MISCARRIAGE WITHIN LAST 12 MONTHS: Primary | ICD-10-CM

## 2019-06-06 DIAGNOSIS — F32.A FATIGUE DUE TO DEPRESSION: ICD-10-CM

## 2019-06-06 NOTE — LETTER
NOTIFICATION RETURN TO WORK 
 
6/6/2019 8:40 AM 
 
[facility] [address] [facility] [id number] To Whom It May Concern: 
 
Meena Rooney is currently under the care of [address] [place] [address]. She will return to work Monday, Veronika 10, 2019. If there are questions or concerns please have the patient contact our office.  
 
 
 
Sincerely, 
 
 
Monique Irwin NP

## 2019-06-06 NOTE — PATIENT INSTRUCTIONS
Miscarriage: Care Instructions  Your Care Instructions    The loss of a pregnancy can be very hard. You may wonder why it happened or blame yourself. Miscarriages are common and are not caused by exercise, stress, or sex. Most happen because the fertilized egg in the uterus does not develop normally. There is no treatment that can stop a miscarriage. As long as you do not have heavy blood loss, fever, weakness, or other signs of infection, you can let a miscarriage follow its own course. This can take several days. Your body will recover over the next several weeks. Having a miscarriage does not mean you cannot have a normal pregnancy in the future. The doctor has checked you carefully, but problems can develop later. If you notice any problems or new symptoms, get medical treatment right away. Follow-up care is a key part of your treatment and safety. Be sure to make and go to all appointments, and call your doctor if you are having problems. It's also a good idea to know your test results and keep a list of the medicines you take. How can you care for yourself at home? · You will probably have some vaginal bleeding for 1 to 2 weeks. It may be similar to or slightly heavier than a normal period. The bleeding should get lighter after a week. Use pads instead of tampons. You may use tampons during your next period, which should start in 3 to 6 weeks. · Take an over-the-counter pain medicine, such as acetaminophen (Tylenol), ibuprofen (Advil, Motrin), or naproxen (Aleve) for cramps. Read and follow all instructions on the label. You may have cramps for several days after the miscarriage. · Do not take two or more pain medicines at the same time unless the doctor told you to. Many pain medicines have acetaminophen, which is Tylenol. Too much acetaminophen (Tylenol) can be harmful. · Use a clear container to save any tissue that you pass. Take it to your doctor's office as soon as you can.   · Do not have sex until the bleeding stops. · You may return to your normal activities if you feel well enough to do so. But you should avoid heavy exercise until the bleeding stops. · If you plan to get pregnant again, check with your doctor. Most doctors suggest waiting until you have had at least one normal period before you try to get pregnant. · If you do not want to get pregnant, ask your doctor about birth control. You can get pregnant again before your next period starts if you are not using birth control. · You may be low in iron because of blood loss. Eat a balanced diet that is high in iron and vitamin C. Foods rich in iron include red meat, shellfish, eggs, beans, and leafy green vegetables. Foods high in vitamin C include citrus fruits, tomatoes, and broccoli. Talk to your doctor about whether you need to take iron pills or a multivitamin. · The loss of a pregnancy can be very hard. You may wonder why it happened and blame yourself. Talking to family members, friends, a counselor, or your doctor may help you cope with your loss. When should you call for help? Call 911 anytime you think you may need emergency care. For example, call if:    · You passed out (lost consciousness).    Call your doctor now or seek immediate medical care if:    · You have severe vaginal bleeding.     · You are dizzy or lightheaded, or you feel like you may faint.     · You have new or worse pain in your belly or pelvis.     · You have a fever.     · You have vaginal discharge that smells bad.    Watch closely for changes in your health, and be sure to contact your doctor if:    · You do not get better as expected. Where can you learn more? Go to http://mario-felipe.info/. Enter E802 in the search box to learn more about \"Miscarriage: Care Instructions. \"  Current as of: September 5, 2018  Content Version: 11.9  © 8981-8353 [employer].  Care instructions adapted under license by [employer] (which disclaims liability or warranty for this information). If you have questions about a medical condition or this instruction, always ask your healthcare professional. [address] any warranty or liability for your use of this information.

## 2019-06-06 NOTE — PROGRESS NOTES
Lisa Langley is a 45 y.o.  female and presents with    Chief Complaint   Patient presents with    Fatigue         Subjective:  Patient presents for follow up after miscarriage. She states she had a miscarriage was seen in Urgent care and sent home with nausea meds and pain meds. She is stable still having some scant bleeding no abdominal pain. She states she is in need of a work note d/t condition. Current Outpatient Medications   Medication Sig Dispense Refill    albuterol (PROVENTIL HFA, VENTOLIN HFA, PROAIR HFA) 90 mcg/actuation inhaler 2 puffs up to every 4 hours if needed for cough, wheezing shortness of breath 1 Inhaler 2    ibuprofen (MOTRIN) 800 mg tablet Take 1 Tab by mouth every eight (8) hours as needed for Pain (Take with food). 40 Tab 0    methylPREDNISolone (MEDROL DOSEPACK) 4 mg tablet Follow dose pack instructions 1 Dose Pack 0    benzonatate (TESSALON) 200 mg capsule Take one capsule 3 times daily if needed for cough 30 Cap 1    triamcinolone acetonide (KENALOG) 0.5 % ointment APPLY TO THE AFFECTED AREA TWICE DAILY. USE THIN LAYER TO ECZEMA ON HANDS AS NEEDED 30 g 0     Allergies   Allergen Reactions    Bactrim [Sulfamethoprim] Hives    Codeine Other (comments)     Other reaction(s): other/intolerance  Clammy and passed out  Pass out, sweaty    Ginger Other (comments)     States body turned red.  Iodinated Contrast- Oral And Iv Dye Unknown (comments)    Morphine Itching and Hives     Other reaction(s): mild rash/itching  Hives    Sulfa (Sulfonamide Antibiotics) Other (comments)     States body turned red. Review of Systems   Constitutional: Negative for chills and fever. Respiratory: Negative for shortness of breath. Cardiovascular: Negative for chest pain and leg swelling. Gastrointestinal: Negative for abdominal pain, constipation, diarrhea, nausea and vomiting. Genitourinary:        +still spotting. Neurological: Negative for headaches. Objective:  Vitals:    06/06/19 0825   BP: 122/78   Pulse: 79   Resp: 18   Temp: 97.2 °F (36.2 °C)   TempSrc: Oral   SpO2: 99%   Weight: 256 lb (116.1 kg)   Height: 5' 5\" (1.651 m)   PainSc:   0 - No pain   LMP: 04/06/2019     General:   Well-groomed, well-nourished, in no distress, pleasant, alert, appropriate    Cardiovasc:   RRR,  no murmur, rubs or gallops. Pulmonary:    Lungs clear bilaterally, no wheezing, rales or rhonchi. Abdomen:   Abdomen soft, normal bowel sounds. No masses, tenderness,    rebound/rigidity or CVA tenderness. No hepatosplenomegaly. Assessment/Plan:      1. Miscarriage within last 12 months  Continue Urgent care plan, meds as needed     2. Fatigue due to depression  This is normal part of grief discussed in detail follow up as needed if not improved in next few months can consider med mgmt    3. Grief associated with loss of fetus  Discussed therapy as needed and stages of grief she will follow up as needed     I have discussed the diagnosis with the patient and the intended plan as seen in the above orders. The patient has received an after-visit summary and questions were answered concerning future plans. I have discussed medication side effects and warnings with the patient as well. I have reviewed the plan of care with the patient, accepted their input and they are in agreement with the treatment goals. Follow-up and Dispositions    · Return if symptoms worsen or fail to improve. More than 1/2 of this 15 minute visit was spent in counselling and coordination of care, as described above.     Josue Dunbar [facility]

## 2019-09-09 ENCOUNTER — OFFICE VISIT (OUTPATIENT)
Dept: FAMILY MEDICINE CLINIC | Age: 39
End: 2019-09-09

## 2019-09-09 VITALS
WEIGHT: 258.8 LBS | HEIGHT: 65 IN | BODY MASS INDEX: 43.12 KG/M2 | TEMPERATURE: 98 F | RESPIRATION RATE: 22 BRPM | OXYGEN SATURATION: 97 % | SYSTOLIC BLOOD PRESSURE: 134 MMHG | DIASTOLIC BLOOD PRESSURE: 90 MMHG | HEART RATE: 85 BPM

## 2019-09-09 DIAGNOSIS — H92.02 OTALGIA OF LEFT EAR: ICD-10-CM

## 2019-09-09 DIAGNOSIS — J30.1 SEASONAL ALLERGIC RHINITIS DUE TO POLLEN: Primary | ICD-10-CM

## 2019-09-09 NOTE — PROGRESS NOTES
Isacc Diaz is a 44 y.o. female (: 1980) presenting to address:    Chief Complaint   Patient presents with    Ear Fullness     LT ear                 pain scale 0/10    patient stated ear feels clogged and itches sometimes       Vitals:    19 1510   BP: 134/90   Pulse: 85   Resp: 22   Temp: 98 °F (36.7 °C)   TempSrc: Oral   SpO2: 97%   Weight: 258 lb 12.8 oz (117.4 kg)   Height: 5' 5\" (1.651 m)   PainSc:   0 - No pain   LMP: 2019       Hearing/Vision:   No exam data present    Learning Assessment:     Learning Assessment 2016   PRIMARY LEARNER Patient   HIGHEST LEVEL OF EDUCATION - PRIMARY LEARNER  > 4 YEARS OF COLLEGE   BARRIERS PRIMARY LEARNER NONE   CO-LEARNER CAREGIVER No   PRIMARY LANGUAGE ENGLISH    NEED No   LEARNER PREFERENCE PRIMARY READING   LEARNING SPECIAL TOPICS none   ANSWERED BY patient   RELATIONSHIP SELF   ASSESSMENT COMMENT n/a     Depression Screening:     3 most recent PHQ Screens 2019   Little interest or pleasure in doing things Not at all   Feeling down, depressed, irritable, or hopeless Not at all   Total Score PHQ 2 0     Fall Risk Assessment:   No flowsheet data found. Abuse Screening:     Abuse Screening Questionnaire 2019   Do you ever feel afraid of your partner? N   Are you in a relationship with someone who physically or mentally threatens you? N   Is it safe for you to go home? Y     Coordination of Care Questionaire:   1. Have you been to the ER, urgent care clinic since your last visit? Hospitalized since your last visit? YES urgent care ( not sure of name )    2. Have you seen or consulted any other health care providers outside of the [address] [place] [address] since your last visit? Include any pap smears or colon screening. NO    Advanced Directive:   1. Do you have an Advanced Directive? NO    2. Would you like information on Advanced Directives?  NO

## 2019-09-13 RX ORDER — IBUPROFEN 800 MG/1
800 TABLET ORAL
Qty: 40 TAB | Refills: 0 | Status: SHIPPED | OUTPATIENT
Start: 2019-09-13 | End: 2019-12-28

## 2019-09-13 NOTE — TELEPHONE ENCOUNTER
Patient is requesting refills:Ibuprofen  Last Filled: 07/24/2018  Qty: 40  Refills: 0  Last Visit: 09/09/2019  No future appointments.   Pharmacy Confirmed

## 2019-09-15 NOTE — PROGRESS NOTES
Subjective:      Natalie Dunn is a 44 y.o. female who presents for possible ear infection. Symptoms include bilateral ear pain, plugged sensation in left ear and PND. Onset of symptoms was 1 week ago, stable since that time. Allergies: Allergies   Allergen Reactions    Bactrim [Sulfamethoprim] Hives    Codeine Other (comments)     Other reaction(s): other/intolerance  Clammy and passed out  Pass out, sweaty    Ginger Other (comments)     States body turned red.  Iodinated Contrast Media Unknown (comments)    Morphine Itching and Hives     Other reaction(s): mild rash/itching  Hives    Sulfa (Sulfonamide Antibiotics) Other (comments)     States body turned red. Medications:     Current Outpatient Medications   Medication Sig    triamcinolone acetonide (KENALOG) 0.5 % ointment APPLY TO THE AFFECTED AREA TWICE DAILY. USE THIN LAYER TO ECZEMA ON HANDS AS NEEDED    ibuprofen (MOTRIN) 800 mg tablet Take 1 Tab by mouth every eight (8) hours as needed for Pain (Take with food).  albuterol (PROVENTIL HFA, VENTOLIN HFA, PROAIR HFA) 90 mcg/actuation inhaler 2 puffs up to every 4 hours if needed for cough, wheezing shortness of breath    methylPREDNISolone (MEDROL DOSEPACK) 4 mg tablet Follow dose pack instructions    benzonatate (TESSALON) 200 mg capsule Take one capsule 3 times daily if needed for cough     No current facility-administered medications for this visit. Objective:     ROS:   Feeling well. No dyspnea or chest pain on exertion. No abdominal pain, change in bowel habits, black or bloody stools. No urinary tract symptoms. No neurological complaints. OBJECTIVE:   The patient appears well, alert, oriented x 3, in no distress.   Visit Vitals  /90 (BP 1 Location: Right arm, BP Patient Position: Sitting)   Pulse 85   Temp 98 °F (36.7 °C) (Oral)   Resp 22   Ht 5' 5\" (1.651 m)   Wt 258 lb 12.8 oz (117.4 kg)   LMP 08/21/2019   SpO2 97%   BMI 43.07 kg/m²     HEENT:TM's bulging no erythema. Neck supple. No adenopathy or thyromegaly. CONOR. Chest: Lungs are clear, good air entry, no wheezes, rhonchi or rales. Cardiovascular: S1 and S2 normal, no murmurs, regular rate and rhythm. Abdomen: soft without tenderness, guarding, mass or organomegaly. Extremities: show no edema, normal peripheral pulses. Neurological: is normal, no focal findings. Assessment/Plan:     Encounter Diagnoses   Name Primary?  Seasonal allergic rhinitis due to pollen Yes    Otalgia of left ear    Trial Flonase return to care as needed likely associated with allergy season. Beatriz Conner

## 2019-09-15 NOTE — PATIENT INSTRUCTIONS
Earache: Care Instructions  Your Care Instructions    Even though infection is a common cause of ear pain, not all ear pain means an infection. If you have ear pain and don't have an infection, it could be because of a jaw problem, such as temporomandibular joint (TMJ) pain. Or it could be because of a neck problem. When ear discomfort or pain is mild or comes and goes without other symptoms, home treatment may be all you need. Follow-up care is a key part of your treatment and safety. Be sure to make and go to all appointments, and call your doctor if you are having problems. It's also a good idea to know your test results and keep a list of the medicines you take. How can you care for yourself at home? · Apply heat on the ear to ease pain. To apply heat, put a warm water bottle, a heating pad set on low, or a warm cloth on your ear. Do not go to sleep with a heating pad on your skin. · Take an over-the-counter pain medicine, such as acetaminophen (Tylenol), ibuprofen (Advil, Motrin), or naproxen (Aleve). Be safe with medicines. Read and follow all instructions on the label. · Do not take two or more pain medicines at the same time unless the doctor told you to. Many pain medicines have acetaminophen, which is Tylenol. Too much acetaminophen (Tylenol) can be harmful. · Never insert anything, such as a cotton swab or a capo pin, into the ear. When should you call for help? Call your doctor now or seek immediate medical care if:    · You have new or worse symptoms of infection, such as:  ? Increased pain, swelling, warmth, or redness. ? Red streaks leading from the area. ? Pus draining from the area. ? A fever.    Watch closely for changes in your health, and be sure to contact your doctor if:    · You have new or worse discharge coming from the ear.     · You do not get better as expected. Where can you learn more? Go to http://mario-felipe.info/.   Enter H015 in the search box to learn more about \"Earache: Care Instructions. \"  Current as of: October 21, 2018  Content Version: 12.1  © 8695-2504 [employer]. Care instructions adapted under license by [employer] (which disclaims liability or warranty for this information). If you have questions about a medical condition or this instruction, always ask your healthcare professional. [address] any warranty or liability for your use of this information. Rhinitis: Care Instructions  Your Care Instructions  Rhinitis is swelling and irritation in the nose. Allergies and infections are often the cause. Your nose may run or feel stuffy. Other symptoms are itchy and sore eyes, ears, throat, and mouth. If allergies are the cause, your doctor may do tests to find out what you are allergic to. You may be able to stop symptoms if you avoid the things that cause them. Your doctor may suggest or prescribe medicine to ease your symptoms. Follow-up care is a key part of your treatment and safety. Be sure to make and go to all appointments, and call your doctor if you are having problems. It's also a good idea to know your test results and keep a list of the medicines you take. How can you care for yourself at home? · If your rhinitis is caused by allergies, try to find out what sets off (triggers) your symptoms. Take steps to avoid these triggers. ? Avoid yard work. It can stir up both pollen and mold. ? Do not smoke or allow others to smoke around you. If you need help quitting, talk to your doctor about stop-smoking programs and medicines. These can increase your chances of quitting for good. ? Do not use aerosol sprays, cleaning products, or perfumes. ? If pollen is one of your triggers, close your house and car windows during blooming season. ? Clean your house often to control dust.  ? Keep pets outside.   · If your doctor recommends over-the-counter medicines to relieve symptoms, take your medicines exactly as prescribed. Call your doctor if you think you are having a problem with your medicine. · Use saline (saltwater) nasal washes to help keep your nasal passages open and wash out mucus and bacteria. You can buy saline nose drops at a grocery store or drugstore. Or you can make your own at home by adding 1 teaspoon of salt and 1 teaspoon of baking soda to 2 cups of distilled water. If you make your own, fill a bulb syringe with the solution, insert the tip into your nostril, and squeeze gently. Randall Ok your nose. When should you call for help? Call your doctor now or seek immediate medical care if:    · You are having trouble breathing.    Watch closely for changes in your health, and be sure to contact your doctor if:    · Mucus from your nose gets thicker (like pus) or has new blood in it.     · You have new or worse symptoms.     · You do not get better as expected. Where can you learn more? Go to http://mario-felipe.info/. Enter M030 in the search box to learn more about \"Rhinitis: Care Instructions. \"  Current as of: October 21, 2018  Content Version: 12.1  © 5478-8156 [employer]. Care instructions adapted under license by [employer] (which disclaims liability or warranty for this information). If you have questions about a medical condition or this instruction, always ask your healthcare professional. [place] [address] any warranty or liability for your use of this information.

## 2019-10-07 ENCOUNTER — OFFICE VISIT (OUTPATIENT)
Dept: FAMILY MEDICINE CLINIC | Age: 39
End: 2019-10-07

## 2019-10-07 VITALS
OXYGEN SATURATION: 97 % | HEART RATE: 77 BPM | WEIGHT: 255.6 LBS | HEIGHT: 65 IN | BODY MASS INDEX: 42.59 KG/M2 | SYSTOLIC BLOOD PRESSURE: 129 MMHG | RESPIRATION RATE: 12 BRPM | DIASTOLIC BLOOD PRESSURE: 85 MMHG | TEMPERATURE: 99.5 F

## 2019-10-07 DIAGNOSIS — M26.622 TMJ TENDERNESS, LEFT: Primary | ICD-10-CM

## 2019-10-07 NOTE — PROGRESS NOTES
Chief Complaint   Patient presents with    Jaw Pain     Pt complains of upon waking the left side of her face is sore that is throbbing       Assessment/Plan  1. TMJ tenderness, left  -nsaids. Monitor. If sx persist past 2 weeks, make appt with dentist for eval of bruxism      The plan was discussed with the patient. The patient verbalized understanding and is in agreement with the plan. All medication potential side effects were discussed with the patient. SUBJECTIVE:   Mechelle Davila is a 44 y.o. female patient of MONIQUE Marinelli who complains of 1 day h/o jaw pain. No f/c. States it feels sore, as if \"I was punched in face\". No sinus pain. Doesn't chew gum. It hurts to talk. No dental pain. Review of Systems - ENT ROS: negative  Respiratory ROS: no cough, shortness of breath, or wheezing  Cardiovascular ROS: no chest pain or dyspnea on exertion  Gastrointestinal ROS: no abdominal pain, change in bowel habits, or black or bloody stools  Musculoskeletal ROS: negative  Neurological ROS: negative    Physical Examination:   Visit Vitals  /85   Pulse 77   Temp 99.5 °F (37.5 °C) (Oral)   Resp 12   Ht 5' 5\" (1.651 m)   Wt 255 lb 9.6 oz (115.9 kg)   LMP 09/17/2019   SpO2 97%   BMI 42.53 kg/m²       Constitutional: Well developed, nourished, no distress, alert   HENT: Exterior ears and tympanic membranes normal bilaterally. Supple neck. No thyromegaly or lymphadenopathy. Oropharynx clear and moist mucous membranes. +pain over L TMJ and clicking of TMJ bilat. Eyes: Conjunctiva normal. PERRL. CV: S1, S2.  RRR. No murmurs/rubs. Pulm: No abnormalities on inspection. Clear to auscultation bilaterally. No wheezing/rhonchi. Normal effort.              Yvonne Lopez MD

## 2019-10-07 NOTE — PATIENT INSTRUCTIONS
Temporomandibular Disorder: Care Instructions  Your Care Instructions    Temporomandibular (TM) disorders are a problem with the muscles and joints that connect your jaw to your skull. They cause pain when you open your mouth, chew, or yawn. You may feel this pain on one or both sides. TM disorders are often caused by tight jaw muscles. The tightness can be caused by clenching or grinding your teeth. This may happen when you have a lot of stress in your life. If you lower your stress, you may be able to stop clenching or grinding your teeth. This will help relax your jaw and reduce your pain. You may also be able to do some things at home to feel better. But if none of this works, your doctor may prescribe medicine to help relax your muscles and control the pain. Follow-up care is a key part of your treatment and safety. Be sure to make and go to all appointments, and call your doctor if you are having problems. It's also a good idea to know your test results and keep a list of the medicines you take. How can you care for yourself at home? · Put a warm, moist cloth or heating pad set on low on your jaw. Do this for 10 to 20 minutes at a time. Put a thin cloth between the heating pad and your skin. · Avoid hard or chewy foods that cause your jaws to work very hard. Examples include popcorn, jerky, tough meats, chewy breads, gum, and raw apples and carrots. · Choose softer foods that are easy to chew. These include eggs, yogurt, and soup. · Cut your food into small pieces. Chew slowly. · If your jaw gets too painful to chew, or if it locks, you may need to puree your food for a few days or weeks. · To relax your jaw, repeat this exercise for a few minutes every morning and evening. Watch yourself in a mirror. Gently open and close your mouth. Move your jaw straight up and down. But don't do this if it makes your pain worse.   · Get at least 30 minutes of exercise on most days of the week to relieve stress. Walking is a good choice. You also may want to do other activities, such as running, swimming, cycling, or playing tennis or team sports. · Do not:  ? Hold a phone between your shoulder and your jaw. ? Open your mouth all the way, like when you sing loudly or yawn. ? Clench or grind your teeth, bite your lips, or chew your fingernails. ? Clench things such as pens, pipes, or cigars between your teeth. When should you call for help? Call your doctor now or seek immediate medical care if:    · Your jaw is locked open or shut or it is hard to move your jaw.    Watch closely for changes in your health, and be sure to contact your doctor if:    · Your jaw pain gets worse.     · Your face is swollen.     · You do not get better as expected. Where can you learn more? Go to http://mario-felipe.info/. Enter G828 in the search box to learn more about \"Temporomandibular Disorder: Care Instructions. \"  Current as of: October 3, 2018  Content Version: 12.2  © 9738-1043 [employer]. Care instructions adapted under license by [employer] (which disclaims liability or warranty for this information). If you have questions about a medical condition or this instruction, always ask your healthcare professional. [address] any warranty or liability for your use of this information.

## 2019-10-07 NOTE — PROGRESS NOTES
ROOM #     Ethel Zamora presents today for   Chief Complaint   Patient presents with    Jaw Pain     Pt complains of upon waking the left side of her face is sore that is throbbing     Visit Vitals  /85   Pulse 77   Temp 99.5 °F (37.5 °C) (Oral)   Resp 12   Ht 5' 5\" (1.651 m)   Wt 255 lb 9.6 oz (115.9 kg)   SpO2 97%   BMI 42.53 kg/m²         [place] preferred language for health care discussion is english/other. Is someone accompanying this pt? no    Is the patient using any DME equipment during OV? no    Depression Screening:  3 most recent PHQ Screens 9/9/2019 6/6/2019 4/29/2019 11/7/2018 8/29/2018 3/27/2018 1/17/2017   Little interest or pleasure in doing things Not at all Not at all Not at all Not at all Not at all Not at all Not at all   Feeling down, depressed, irritable, or hopeless Not at all Not at all Not at all Not at all Not at all Not at all Not at all   Total Score PHQ 2 0 0 0 0 0 0 0       Learning Assessment:  Learning Assessment 2/4/2016   PRIMARY LEARNER Patient   HIGHEST LEVEL OF EDUCATION - PRIMARY LEARNER  > 4 YEARS [address] CAREGIVER No   PRIMARY LANGUAGE ENGLISH    NEED No   LEARNER PREFERENCE PRIMARY READING   LEARNING SPECIAL TOPICS none   ANSWERED BY patient   RELATIONSHIP SELF   ASSESSMENT COMMENT n/a       Abuse Screening:  Abuse Screening Questionnaire 9/9/2019 6/6/2019 3/27/2018 2/4/2016   Do you ever feel afraid of your partner? N N N N   Are you in a relationship with someone who physically or mentally threatens you? N N N N   Is it safe for you to go home? Y Y Y Y       Fall Risk  No flowsheet data found. Health Maintenance reviewed and discussed per provider. Yes    Ethel Zamora is due for   Health Maintenance Due   Topic Date Due    DTaP/Tdap/Td series (1 - Tdap) 06/19/2001    PAP AKA CERVICAL CYTOLOGY  06/19/2001         Please order/place referral if appropriate.       Advance Directive:  1. Do you have an advance directive in place? Patient Reply: no    2. If not, would you like material regarding how to put one in place? Patient Reply: no    Coordination of Care:  1. Have you been to the ER, urgent care clinic since your last visit? Hospitalized since your last visit? no    2. Have you seen or consulted any other health care providers outside of the [address] [place] [address] since your last visit? Include any pap smears or colon screening.  no

## 2019-12-28 RX ORDER — IBUPROFEN 800 MG/1
TABLET ORAL
Qty: 40 TAB | Refills: 0 | Status: SHIPPED | OUTPATIENT
Start: 2019-12-28 | End: 2021-01-22 | Stop reason: SDUPTHER

## 2020-07-13 NOTE — PROGRESS NOTES
Fall out of bed from the top bunk onto concrete  CT head wo contrast without acute abnormality Sunni Farley is a 45 y.o. female (: 1980) presenting to address:    Chief Complaint   Patient presents with    Fatigue       Vitals:    19 0825   Resp: 18   Weight: 256 lb (116.1 kg)   Height: 5' 5\" (1.651 m)   PainSc:   0 - No pain   LMP: 2019       Hearing/Vision:   No exam data present    Learning Assessment:     Learning Assessment 2016   PRIMARY LEARNER Patient   HIGHEST LEVEL OF EDUCATION - PRIMARY LEARNER  > 4 YEARS OF COLLEGE   BARRIERS PRIMARY LEARNER NONE   CO-LEARNER CAREGIVER No   PRIMARY LANGUAGE ENGLISH    NEED No   LEARNER PREFERENCE PRIMARY READING   LEARNING SPECIAL TOPICS none   ANSWERED BY patient   RELATIONSHIP SELF   ASSESSMENT COMMENT n/a     Depression Screening:     3 most recent PHQ Screens 2019   Little interest or pleasure in doing things Not at all   Feeling down, depressed, irritable, or hopeless Not at all   Total Score PHQ 2 0     Fall Risk Assessment:   No flowsheet data found. Abuse Screening:     Abuse Screening Questionnaire 2019   Do you ever feel afraid of your partner? N   Are you in a relationship with someone who physically or mentally threatens you? N   Is it safe for you to go home? Y     Coordination of Care Questionaire:   1. Have you been to the ER, urgent care clinic since your last visit? Hospitalized since your last visit? Yes, [facility], miscarriage 19   2. Have you seen or consulted any other health care providers outside of the [address] [place] [address] since your last visit? Include any pap smears or colon screening. No     Advanced Directive:   1. Do you have an Advanced Directive? No     2. Would you like information on Advanced Directives?   No     Health Maintenance Due   Topic Date Due    DTaP/Tdap/Td series (1 - Tdap) 2001    PAP AKA CERVICAL CYTOLOGY  2001

## 2021-01-22 ENCOUNTER — VIRTUAL VISIT (OUTPATIENT)
Dept: FAMILY MEDICINE CLINIC | Age: 41
End: 2021-01-22
Payer: COMMERCIAL

## 2021-01-22 DIAGNOSIS — E66.9 OBESITY (BMI 30-39.9): ICD-10-CM

## 2021-01-22 DIAGNOSIS — E78.2 MIXED HYPERLIPIDEMIA: ICD-10-CM

## 2021-01-22 DIAGNOSIS — E55.9 VITAMIN D DEFICIENCY: ICD-10-CM

## 2021-01-22 DIAGNOSIS — M25.50 PAIN IN JOINTS: Primary | ICD-10-CM

## 2021-01-22 PROCEDURE — 99214 OFFICE O/P EST MOD 30 MIN: CPT | Performed by: INTERNAL MEDICINE

## 2021-01-22 RX ORDER — IBUPROFEN 800 MG/1
TABLET ORAL
Qty: 30 TAB | Refills: 1 | Status: SHIPPED | OUTPATIENT
Start: 2021-01-22

## 2021-01-22 NOTE — PROGRESS NOTES
Anmol Ball is a 36 y.o. female who was seen by synchronous (real-time) audio-video technology on 1/22/2021 for Medication Evaluation        Assessment & Plan:     Diagnoses and all orders for this visit:    1. Pain in joints, stable  -     ibuprofen (MOTRIN) 800 mg tablet; TAKE 1 TABLET BY MOUTH EVERY 8 HOURS WITH FOOD AS NEEDED FOR PAIN    2. Obesity (BMI 30-39.9), improving, continue diet and weight loss    3. Vitamin D deficiency , not controlled, will repeat labs first    4. Mixed hyperlipidemia, improving , will repeat labs    Pt will follow up for CPE in a month, will check labs then including CBC/CMP/Vit-D/FLP/TSH      Follow-up and Dispositions    · Return in about 1 month (around 2/22/2021). 712  Subjective:   Joint pain, controlled, use Motrin 800 mg as needed, about once a week or so, mainly after working out, need refills, no pain now  Obesity, improving, on diet, she lost 35 lbs since last visit over a year ago  Vit d def, not controlled, last level was 29 on 10-23-15, she is not taking any vit d now  HLD, ? Control, she has been on diet, last LDL was 123 on 10-23-15      Prior to Admission medications    Medication Sig Start Date End Date Taking? Authorizing Provider   ibuprofen (MOTRIN) 800 mg tablet TAKE 1 TABLET BY MOUTH EVERY 8 HOURS WITH FOOD AS NEEDED FOR PAIN 1/22/21  Yes Jose Alberto Stoll MD   triamcinolone acetonide (KENALOG) 0.5 % ointment APPLY TO THE AFFECTED AREA TWICE DAILY.  USE THIN LAYER TO ECZEMA ON HANDS AS NEEDED 10/29/18   Cesar Estrada MD     Patient Active Problem List    Diagnosis Date Noted    Obesity, morbid ([facility] [address] 12/08/2017    History of orthopedic surgery 12/08/2017    Decreased range of motion of finger 01/18/2017    Left wrist pain 01/18/2017    Dermatitis 01/18/2017    Pain in finger of right hand 10/31/2016    Acute bilateral low back pain without sciatica 06/24/2016    Urinary frequency 06/24/2016    Vitamin D deficiency 02/04/2016    BMI 39.0-39.9,adult 02/04/2016    Restless sleeper 02/04/2016    Influenza vaccination declined 02/04/2016     Past Medical History:   Diagnosis Date    BMI 39.0-39.9,adult 2/4/2016    Restless sleeper 2/4/2016    Vitamin D deficiency      Social History     Tobacco Use    Smoking status: Never Smoker    Smokeless tobacco: Never Used   Substance Use Topics    Alcohol use: Yes     Comment: Once a week       Review of Systems   Constitutional: Negative for fever. Respiratory: Negative for cough and shortness of breath. Cardiovascular: Negative for chest pain. Objective:     Patient-Reported Vitals 1/22/2021   Patient-Reported Weight 220 lbs   Patient-Reported Pulse 71      General: alert, cooperative, no distress   Mental  status: normal mood, behavior, speech, dress, motor activity, and thought processes, able to follow commands   HENT: NCAT   Neck: no visualized mass   Resp: no respiratory distress   Neuro: no gross deficits   Skin: no discoloration or lesions of concern on visible areas   Psychiatric: normal affect, consistent with stated mood, no evidence of hallucinations     Additional exam findings: We discussed the expected course, resolution and complications of the diagnosis(es) in detail. Medication risks, benefits, costs, interactions, and alternatives were discussed as indicated. I advised her to contact the office if her condition worsens, changes or fails to improve as anticipated. She expressed understanding with the diagnosis(es) and plan. Raffi Luo, who was evaluated through a patient-initiated, synchronous (real-time) audio-video encounter, and/or her healthcare decision maker, is aware that it is a billable service, with coverage as determined by her insurance carrier. She provided verbal consent to proceed: Yes, and patient identification was verified.  It was conducted pursuant to the emergency declaration under the [address] [place] [address] Act, [facility] authority and the [employer] Act. A caregiver was present when appropriate. Ability to conduct physical exam was limited. I was in the office. The patient was at home.       Pia Fagan MD

## 2022-03-19 PROBLEM — M25.649 DECREASED RANGE OF MOTION OF FINGER: Status: ACTIVE | Noted: 2017-01-18

## 2022-03-19 PROBLEM — L30.9 DERMATITIS: Status: ACTIVE | Noted: 2017-01-18

## 2022-03-19 PROBLEM — Z98.890 HISTORY OF ORTHOPEDIC SURGERY: Status: ACTIVE | Noted: 2017-12-08

## 2022-03-19 PROBLEM — E66.01 OBESITY, MORBID (HCC): Status: ACTIVE | Noted: 2017-12-08

## 2022-03-20 PROBLEM — M25.532 LEFT WRIST PAIN: Status: ACTIVE | Noted: 2017-01-18

## 2023-03-22 NOTE — PATIENT INSTRUCTIONS

## 2024-01-01 NOTE — PROGRESS NOTES
Clothing Emmanuel Paige is a 40 y.o. female and presents with    Chief Complaint   Patient presents with    Ankle swelling     Left; x3 weeks     Ankle Pain     Subjective: Ankle Pain  Patient complains of left ankle pain. Onset of the symptoms was Wednesday. Patient had orthopedic surgery 06/2017 x 2 and since this time has had recurrent infections. She was treated in June, August, September, November, 3 rounds of keflex,, and last occurrence was given bactrim. Patient states each time the infection subsides but continues to return. Area is on left lateral side of ankle and is approx size of a dime. She denies any drainage, or warmth at site currently. She states swelling, and erythema. Current Outpatient Prescriptions   Medication Sig Dispense Refill    mupirocin (BACTROBAN) 2 % ointment Apply  to affected area daily. 22 g 0    doxycycline (ADOXA) 100 mg tablet Take 1 Tab by mouth two (2) times a day for 10 days. 20 Tab 0    multivitamin (ONE A DAY) tablet Take 1 Tab by mouth daily.  ibuprofen (MOTRIN) 600 mg tablet Take 1 Tab by mouth every eight (8) hours as needed for Pain. Indications: PAIN 60 Tab 2    B.infantis-B.ani-B.long-B.bifi (PROBIOTIC 4X) 10-15 mg TbEC Take  by mouth.  ketoconazole (NIZORAL) 200 mg tablet Take 1 Tab by mouth daily. 2 Tab 0    ketoconazole (NIZORAL) 2 % topical cream Apply  to affected area two (2) times a day. 30 g 1    fluticasone (FLONASE) 50 mcg/actuation nasal spray 2 Sprays by Both Nostrils route daily. 1 Bottle 0    ergocalciferol (ERGOCALCIFEROL) 50,000 unit capsule Take 1 Cap by mouth every seven (7) days. 12 Cap 1    metFORMIN (GLUCOPHAGE) 500 mg tablet Take 1 Tab by mouth two (2) times daily (with meals). 180 Tab 1    topiramate (TOPAMAX) 25 mg tablet Take 1 Tab by mouth two (2) times a day. 180 Tab 0    omega-3 fatty acids-vitamin e 1,000 mg cap Take 1 Cap by mouth.  cyanocobalamin 1,000 mcg tablet Take 1,000 mcg by mouth daily.        Allergies Allergen Reactions    Codeine Other (comments)     Pass out, sweaty    Morphine Itching     Hives     Review of Systems   Skin:        Redness, and swelling left ankle     All other systems reviewed and are negative. Objective:  Vitals:    12/08/17 0906   BP: (!) 140/91   Pulse: 89   Resp: 18   Temp: 97.6 °F (36.4 °C)   TempSrc: Oral   SpO2: 94%   Weight: 241 lb (109.3 kg)   Height: 5' 5\" (1.651 m)   PainSc:   0 - No pain   LMP: 11/20/2017     Physical Exam   Constitutional: She appears well-developed and well-nourished. Cardiovascular: Normal rate, regular rhythm and normal heart sounds. Pulmonary/Chest: Effort normal and breath sounds normal.   Abdominal: Soft. Bowel sounds are normal.   Skin: Skin is warm and dry. There is erythema. There is erythema, swelling with wound approx 1 cm linear across the line of her scar. No drainage noted not TTP. Assessment/Plan:      1. Local infection of skin and subcutaneous tissue / Left ankle pain, unspecified chronicity / History of orthopedic surgery  Discussed need for continued care by orthopedics. Patient is unhappy with the care from [address] [place] [address] and is requesting new referral.   Discussed recurrent infection and close association to hardware as stated by ortho. Patient aware she may need f/u surgery for this condition as previously stated by Ortho. Watch for s/s of increased infection. Wound care instructions given to include warm compresses, bactroban to site, and oral abx. RTC as needed for follow up while awaiting new ortho appt. - REFERRAL TO ORTHOPEDICS    I have discussed the diagnosis with the patient and the intended plan as seen in the above orders. The patient has received an after-visit summary and questions were answered concerning future plans. I have discussed medication side effects and warnings with the patient as well.  I have reviewed the plan of care with the patient, accepted their input and they are in agreement with the treatment goals.

## 2024-07-15 SDOH — HEALTH STABILITY: PHYSICAL HEALTH: ON AVERAGE, HOW MANY MINUTES DO YOU ENGAGE IN EXERCISE AT THIS LEVEL?: 30 MIN

## 2024-07-15 SDOH — HEALTH STABILITY: PHYSICAL HEALTH: ON AVERAGE, HOW MANY DAYS PER WEEK DO YOU ENGAGE IN MODERATE TO STRENUOUS EXERCISE (LIKE A BRISK WALK)?: 1 DAY

## 2024-07-16 ENCOUNTER — HOSPITAL ENCOUNTER (OUTPATIENT)
Facility: HOSPITAL | Age: 44
Setting detail: SPECIMEN
Discharge: HOME | End: 2024-07-19
Payer: COMMERCIAL

## 2024-07-16 ENCOUNTER — TELEPHONE (OUTPATIENT)
Dept: FAMILY MEDICINE CLINIC | Facility: CLINIC | Age: 44
End: 2024-07-16

## 2024-07-16 ENCOUNTER — OFFICE VISIT (OUTPATIENT)
Dept: FAMILY MEDICINE CLINIC | Facility: CLINIC | Age: 44
End: 2024-07-16
Payer: COMMERCIAL

## 2024-07-16 VITALS
RESPIRATION RATE: 16 BRPM | HEART RATE: 76 BPM | DIASTOLIC BLOOD PRESSURE: 88 MMHG | WEIGHT: 278 LBS | BODY MASS INDEX: 46.32 KG/M2 | SYSTOLIC BLOOD PRESSURE: 138 MMHG | OXYGEN SATURATION: 95 % | HEIGHT: 65 IN | TEMPERATURE: 98.1 F

## 2024-07-16 DIAGNOSIS — E55.9 HYPOVITAMINOSIS D: ICD-10-CM

## 2024-07-16 DIAGNOSIS — E66.01 CLASS 3 SEVERE OBESITY DUE TO EXCESS CALORIES WITHOUT SERIOUS COMORBIDITY WITH BODY MASS INDEX (BMI) OF 45.0 TO 49.9 IN ADULT (HCC): ICD-10-CM

## 2024-07-16 DIAGNOSIS — Z00.00 ENCOUNTER FOR WELL ADULT EXAM WITHOUT ABNORMAL FINDINGS: ICD-10-CM

## 2024-07-16 DIAGNOSIS — Z00.00 ENCOUNTER FOR WELL ADULT EXAM WITHOUT ABNORMAL FINDINGS: Primary | ICD-10-CM

## 2024-07-16 DIAGNOSIS — Z12.31 ENCOUNTER FOR SCREENING MAMMOGRAM FOR BREAST CANCER: ICD-10-CM

## 2024-07-16 LAB
25(OH)D3 SERPL-MCNC: 70.6 NG/ML (ref 30–100)
ALBUMIN SERPL-MCNC: 3.4 G/DL (ref 3.4–5)
ALBUMIN/GLOB SERPL: 0.9 (ref 0.8–1.7)
ALP SERPL-CCNC: 72 U/L (ref 45–117)
ALT SERPL-CCNC: 31 U/L (ref 13–56)
ANION GAP SERPL CALC-SCNC: 8 MMOL/L (ref 3–18)
AST SERPL-CCNC: 17 U/L (ref 10–38)
BASOPHILS # BLD: 0 K/UL (ref 0–0.1)
BASOPHILS NFR BLD: 0 % (ref 0–2)
BILIRUB SERPL-MCNC: 0.4 MG/DL (ref 0.2–1)
BUN SERPL-MCNC: 10 MG/DL (ref 7–18)
BUN/CREAT SERPL: 14 (ref 12–20)
CALCIUM SERPL-MCNC: 9.7 MG/DL (ref 8.5–10.1)
CHLORIDE SERPL-SCNC: 104 MMOL/L (ref 100–111)
CHOLEST SERPL-MCNC: 188 MG/DL
CO2 SERPL-SCNC: 26 MMOL/L (ref 21–32)
CREAT SERPL-MCNC: 0.74 MG/DL (ref 0.6–1.3)
DIFFERENTIAL METHOD BLD: ABNORMAL
EOSINOPHIL # BLD: 0.2 K/UL (ref 0–0.4)
EOSINOPHIL NFR BLD: 1 % (ref 0–5)
ERYTHROCYTE [DISTWIDTH] IN BLOOD BY AUTOMATED COUNT: 15.5 % (ref 11.6–14.5)
EST. AVERAGE GLUCOSE BLD GHB EST-MCNC: 100 MG/DL
GLOBULIN SER CALC-MCNC: 3.9 G/DL (ref 2–4)
GLUCOSE SERPL-MCNC: 96 MG/DL (ref 74–99)
HBA1C MFR BLD: 5.1 % (ref 4.2–5.6)
HCT VFR BLD AUTO: 38.2 % (ref 35–45)
HDLC SERPL-MCNC: 62 MG/DL (ref 40–60)
HDLC SERPL: 3 (ref 0–5)
HGB BLD-MCNC: 12.4 G/DL (ref 12–16)
IMM GRANULOCYTES # BLD AUTO: 0.1 K/UL (ref 0–0.04)
IMM GRANULOCYTES NFR BLD AUTO: 0 % (ref 0–0.5)
LDLC SERPL CALC-MCNC: 78.4 MG/DL (ref 0–100)
LIPID PANEL: ABNORMAL
LYMPHOCYTES # BLD: 1.9 K/UL (ref 0.9–3.6)
LYMPHOCYTES NFR BLD: 14 % (ref 21–52)
MCH RBC QN AUTO: 30.1 PG (ref 24–34)
MCHC RBC AUTO-ENTMCNC: 32.5 G/DL (ref 31–37)
MCV RBC AUTO: 92.7 FL (ref 78–100)
MONOCYTES # BLD: 1.1 K/UL (ref 0.05–1.2)
MONOCYTES NFR BLD: 8 % (ref 3–10)
NEUTS SEG # BLD: 9.9 K/UL (ref 1.8–8)
NEUTS SEG NFR BLD: 76 % (ref 40–73)
NRBC # BLD: 0 K/UL (ref 0–0.01)
NRBC BLD-RTO: 0 PER 100 WBC
PLATELET # BLD AUTO: 351 K/UL (ref 135–420)
PMV BLD AUTO: 10.4 FL (ref 9.2–11.8)
POTASSIUM SERPL-SCNC: 4 MMOL/L (ref 3.5–5.5)
PROT SERPL-MCNC: 7.3 G/DL (ref 6.4–8.2)
RBC # BLD AUTO: 4.12 M/UL (ref 4.2–5.3)
SODIUM SERPL-SCNC: 138 MMOL/L (ref 136–145)
TRIGL SERPL-MCNC: 238 MG/DL
TSH SERPL-ACNC: 2.01 UIU/ML (ref 0.36–3.74)
VLDLC SERPL CALC-MCNC: 47.6 MG/DL
WBC # BLD AUTO: 13.1 K/UL (ref 4.6–13.2)

## 2024-07-16 PROCEDURE — 80053 COMPREHEN METABOLIC PANEL: CPT

## 2024-07-16 PROCEDURE — 80061 LIPID PANEL: CPT

## 2024-07-16 PROCEDURE — 83036 HEMOGLOBIN GLYCOSYLATED A1C: CPT

## 2024-07-16 PROCEDURE — 99396 PREV VISIT EST AGE 40-64: CPT | Performed by: STUDENT IN AN ORGANIZED HEALTH CARE EDUCATION/TRAINING PROGRAM

## 2024-07-16 PROCEDURE — 82306 VITAMIN D 25 HYDROXY: CPT

## 2024-07-16 PROCEDURE — 85025 COMPLETE CBC W/AUTO DIFF WBC: CPT

## 2024-07-16 PROCEDURE — 36415 COLL VENOUS BLD VENIPUNCTURE: CPT

## 2024-07-16 PROCEDURE — 84443 ASSAY THYROID STIM HORMONE: CPT

## 2024-07-16 RX ORDER — ERGOCALCIFEROL 1.25 MG/1
50000 CAPSULE ORAL WEEKLY
COMMUNITY

## 2024-07-16 RX ORDER — TIRZEPATIDE 2.5 MG/.5ML
2.5 INJECTION, SOLUTION SUBCUTANEOUS WEEKLY
Qty: 4 EACH | Refills: 1 | Status: SHIPPED | OUTPATIENT
Start: 2024-07-16

## 2024-07-16 RX ORDER — UBIDECARENONE 75 MG
50 CAPSULE ORAL DAILY
COMMUNITY

## 2024-07-16 RX ORDER — TIRZEPATIDE 5 MG/.5ML
5 INJECTION, SOLUTION SUBCUTANEOUS WEEKLY
Qty: 4 ADJUSTABLE DOSE PRE-FILLED PEN SYRINGE | Refills: 5 | Status: SHIPPED | OUTPATIENT
Start: 2024-08-16

## 2024-07-16 SDOH — ECONOMIC STABILITY: FOOD INSECURITY: WITHIN THE PAST 12 MONTHS, THE FOOD YOU BOUGHT JUST DIDN'T LAST AND YOU DIDN'T HAVE MONEY TO GET MORE.: PATIENT DECLINED

## 2024-07-16 SDOH — ECONOMIC STABILITY: INCOME INSECURITY: HOW HARD IS IT FOR YOU TO PAY FOR THE VERY BASICS LIKE FOOD, HOUSING, MEDICAL CARE, AND HEATING?: PATIENT DECLINED

## 2024-07-16 SDOH — ECONOMIC STABILITY: FOOD INSECURITY: WITHIN THE PAST 12 MONTHS, YOU WORRIED THAT YOUR FOOD WOULD RUN OUT BEFORE YOU GOT MONEY TO BUY MORE.: PATIENT DECLINED

## 2024-07-16 SDOH — ECONOMIC STABILITY: HOUSING INSECURITY
IN THE LAST 12 MONTHS, WAS THERE A TIME WHEN YOU DID NOT HAVE A STEADY PLACE TO SLEEP OR SLEPT IN A SHELTER (INCLUDING NOW)?: PATIENT DECLINED

## 2024-07-16 ASSESSMENT — ENCOUNTER SYMPTOMS
CHEST TIGHTNESS: 0
COUGH: 0
SHORTNESS OF BREATH: 0
NAUSEA: 0
ABDOMINAL PAIN: 0
SORE THROAT: 0
EYE PAIN: 0
DIARRHEA: 0
RHINORRHEA: 0
CONSTIPATION: 0
BACK PAIN: 0
VOMITING: 0

## 2024-07-16 ASSESSMENT — PATIENT HEALTH QUESTIONNAIRE - PHQ9
SUM OF ALL RESPONSES TO PHQ QUESTIONS 1-9: 0
SUM OF ALL RESPONSES TO PHQ9 QUESTIONS 1 & 2: 0
SUM OF ALL RESPONSES TO PHQ QUESTIONS 1-9: 0
SUM OF ALL RESPONSES TO PHQ QUESTIONS 1-9: 0
1. LITTLE INTEREST OR PLEASURE IN DOING THINGS: NOT AT ALL
SUM OF ALL RESPONSES TO PHQ QUESTIONS 1-9: 0
2. FEELING DOWN, DEPRESSED OR HOPELESS: NOT AT ALL

## 2024-07-16 NOTE — PROGRESS NOTES
[facility]      MR#: [id number]    HISTORY OF PRESENT ILLNESS  History of Present Illness  The patient is a 44-year-old female who presents to [facility].    The patient's last Pap smear was conducted in 2018. She has not undergone a mammogram. She maintains an active lifestyle, engaging in walking for 30 minutes daily. Her cholesterol, liver, and kidney function were evaluated by her employer, revealing slightly elevated cholesterol levels. However, her blood pressure was notably high. Her current medication regimen includes vitamin D, vitamin B12, potassium, calcium, magnesium, and zinc. She experiences ankle swelling and has a history of bilateral ankle fracture. Despite engaging in cardio exercises, she struggles with weight loss. She abstained from soda two years ago and maintains a healthy diet. She has a history of cholecystectomy. Her weight has remained stable over the past year. She has previously tried phentermine and metformin for weight loss, but discontinued due to dry mouth and decreased appetite. Wegovy is not covered by her insurance. She has not been on birth control since the age of 30. She reports normal bowel movements and urination. She experiences occasional ankle swelling.   The patient denies smoking, some alcohol use, no drugs. She lives by herself. She works as an analyst.   She denies any family history of thyroid disease.   She is up-to-date on her COVID-19 and influenza vaccines.    Past medical history:  Obesity  History of lower back pain  Vitamin D deficiency    Social:  Tobacco use: Never  Alcohol use: Yes   Illicit drug use: Denies  Housing: Lives in [place], lives alone  Employment: Analyst at [employer]     Health maintenance:  Colon cancer screening: At 45  Breast cancer screening: Due   Cervical cancer screening: Due   COVID: Pfizer x 3  Influenza: UTD  PCV: At 65  Shingles: At 50      Current Outpatient Medications:     vitamin B-12

## 2024-07-16 NOTE — PROGRESS NOTES
Stefanie Howard is a 44 y.o. female (: 1980) presenting to address:    Chief Complaint   Patient presents with    New Patient       Vitals:    24 1459   BP: 138/88   Pulse: 76   Resp: 16   Temp: 98.1 °F (36.7 °C)   SpO2: 95%       \"Have you been to the ER, urgent care clinic since your last visit?  Hospitalized since your last visit?\"    NO    “Have you seen or consulted any other health care providers outside of [facility] since your last visit?”    NO       Have you had a mammogram?”   NO    No breast cancer screening on file      “Have you had a pap smear?”    NO    No cervical cancer screening on file

## 2024-07-22 ENCOUNTER — TELEPHONE (OUTPATIENT)
Dept: FAMILY MEDICINE CLINIC | Facility: CLINIC | Age: 44
End: 2024-07-22

## 2024-07-22 NOTE — TELEPHONE ENCOUNTER
Submitted and received PA denial for Mounjaro d/t pt not being Type II DM; document w/be scanned into pts chart and pt will be notified.

## 2024-07-24 DIAGNOSIS — E66.01 CLASS 3 SEVERE OBESITY DUE TO EXCESS CALORIES WITHOUT SERIOUS COMORBIDITY WITH BODY MASS INDEX (BMI) OF 45.0 TO 49.9 IN ADULT (HCC): Primary | ICD-10-CM

## 2024-07-28 DIAGNOSIS — E66.01 CLASS 3 SEVERE OBESITY DUE TO EXCESS CALORIES WITHOUT SERIOUS COMORBIDITY WITH BODY MASS INDEX (BMI) OF 45.0 TO 49.9 IN ADULT (HCC): Primary | ICD-10-CM

## 2024-09-04 DIAGNOSIS — E66.01 CLASS 3 SEVERE OBESITY DUE TO EXCESS CALORIES WITHOUT SERIOUS COMORBIDITY WITH BODY MASS INDEX (BMI) OF 45.0 TO 49.9 IN ADULT (HCC): Primary | ICD-10-CM

## 2025-05-09 ENCOUNTER — OFFICE VISIT (OUTPATIENT)
Dept: FAMILY MEDICINE CLINIC | Facility: CLINIC | Age: 45
End: 2025-05-09
Payer: COMMERCIAL

## 2025-05-09 VITALS
HEIGHT: 65 IN | SYSTOLIC BLOOD PRESSURE: 120 MMHG | HEART RATE: 99 BPM | RESPIRATION RATE: 14 BRPM | WEIGHT: 252.2 LBS | DIASTOLIC BLOOD PRESSURE: 68 MMHG | TEMPERATURE: 98.2 F | BODY MASS INDEX: 42.02 KG/M2 | OXYGEN SATURATION: 96 %

## 2025-05-09 DIAGNOSIS — M79.671 RIGHT FOOT PAIN: Primary | ICD-10-CM

## 2025-05-09 PROCEDURE — 99214 OFFICE O/P EST MOD 30 MIN: CPT | Performed by: STUDENT IN AN ORGANIZED HEALTH CARE EDUCATION/TRAINING PROGRAM

## 2025-05-09 SDOH — ECONOMIC STABILITY: FOOD INSECURITY: WITHIN THE PAST 12 MONTHS, THE FOOD YOU BOUGHT JUST DIDN'T LAST AND YOU DIDN'T HAVE MONEY TO GET MORE.: NEVER TRUE

## 2025-05-09 SDOH — ECONOMIC STABILITY: FOOD INSECURITY: WITHIN THE PAST 12 MONTHS, YOU WORRIED THAT YOUR FOOD WOULD RUN OUT BEFORE YOU GOT MONEY TO BUY MORE.: NEVER TRUE

## 2025-05-09 ASSESSMENT — ENCOUNTER SYMPTOMS
VOMITING: 0
BACK PAIN: 0
COUGH: 0
EYE PAIN: 0
ABDOMINAL PAIN: 0
NAUSEA: 0
SHORTNESS OF BREATH: 0
CHEST TIGHTNESS: 0
SORE THROAT: 0
DIARRHEA: 0
RHINORRHEA: 0
CONSTIPATION: 0

## 2025-05-09 ASSESSMENT — PATIENT HEALTH QUESTIONNAIRE - PHQ9
SUM OF ALL RESPONSES TO PHQ QUESTIONS 1-9: 0
SUM OF ALL RESPONSES TO PHQ QUESTIONS 1-9: 0
1. LITTLE INTEREST OR PLEASURE IN DOING THINGS: NOT AT ALL
SUM OF ALL RESPONSES TO PHQ QUESTIONS 1-9: 0
2. FEELING DOWN, DEPRESSED OR HOPELESS: NOT AT ALL
SUM OF ALL RESPONSES TO PHQ QUESTIONS 1-9: 0

## 2025-05-09 NOTE — PROGRESS NOTES
Stefanie Howard is a 44 y.o. female (: 1980) presenting to address:    Chief Complaint   Patient presents with    Foot Pain     Right foot pain x 1month, xrayed at [facility], not fractured, still hurts; only when walking       Vitals:    25 1410   BP: 120/68   Pulse: 99   Resp: 14   Temp: 98.2 °F (36.8 °C)   SpO2: 96%       \"Have you been to the ER, urgent care clinic since your last visit?  Hospitalized since your last visit?\"    YES - When: approximately 7 days ago.  Where and Why: [facility] urgent care.    “Have you seen or consulted any other health care providers outside of [facility] since your last visit?”    NO       Have you had a mammogram?”   NO    No breast cancer screening on file      “Have you had a pap smear?”    NO    No cervical cancer screening on file           
distress.     Appearance: Normal appearance. She is normal weight. She is not ill-appearing.   HENT:      Head: Normocephalic and atraumatic.   Cardiovascular:      Pulses:           Dorsalis pedis pulses are 2+ on the right side.   Musculoskeletal:      Right foot: Decreased range of motion. Bony tenderness present. No deformity, bunion, foot drop, prominent metatarsal heads, laceration or crepitus. Normal pulse.      Comments: Tenderness over middle of fifth metatarsal    Feet:      Right foot:      Skin integrity: Skin integrity normal.   Skin:     General: Skin is warm.   Neurological:      General: No focal deficit present.      Mental Status: She is alert. Mental status is at baseline.   Psychiatric:         Mood and Affect: Mood normal.         Behavior: Behavior normal.         Thought Content: Thought content normal.         Judgment: Judgment normal.        Results  Imaging   - X-ray of the right foot: 04/18/2025, No acute fracture     ASSESSMENT and PLAN    Stefanie was seen today for foot pain.    Diagnoses and all orders for this visit:    Right foot pain  -     PROCARE XCELTRAX Walking Boot  -     MRI FOOT RIGHT WO CONTRAST; Future       Assessment & Plan  1. Right outer foot pain.  - The patient presents with right outer foot pain persisting for almost a month. She was seen at urgent care on 04/18/2025, where x-rays showed no acute fracture.  - The pain occurs during walking and motion but not during standing. There is no swelling, but there is tenderness at the end of the fifth metatarsal.  - A stress fracture is suspected. An MRI of the right foot without contrast will be ordered to confirm the diagnosis.  - She is advised to use a CAM walking boot for 7 to 10 days to alleviate pressure and facilitate healing. If she does not have a boot at home, one will be provided. NSAIDs like ibuprofen are recommended for pain management. If there is no improvement after using the CAM boot for 10 days, further

## 2025-08-15 DIAGNOSIS — E66.813 CLASS 3 SEVERE OBESITY DUE TO EXCESS CALORIES WITHOUT SERIOUS COMORBIDITY WITH BODY MASS INDEX (BMI) OF 45.0 TO 49.9 IN ADULT (HCC): Primary | ICD-10-CM

## 2025-08-26 ENCOUNTER — TELEPHONE (OUTPATIENT)
Dept: FAMILY MEDICINE CLINIC | Facility: CLINIC | Age: 45
End: 2025-08-26

## 2025-08-26 DIAGNOSIS — E66.813 CLASS 3 SEVERE OBESITY DUE TO EXCESS CALORIES WITHOUT SERIOUS COMORBIDITY WITH BODY MASS INDEX (BMI) OF 45.0 TO 49.9 IN ADULT (HCC): Primary | ICD-10-CM
